# Patient Record
Sex: FEMALE | Race: ASIAN | NOT HISPANIC OR LATINO
[De-identification: names, ages, dates, MRNs, and addresses within clinical notes are randomized per-mention and may not be internally consistent; named-entity substitution may affect disease eponyms.]

---

## 2017-08-29 ENCOUNTER — RX RENEWAL (OUTPATIENT)
Age: 67
End: 2017-08-29

## 2017-11-15 ENCOUNTER — RX RENEWAL (OUTPATIENT)
Age: 67
End: 2017-11-15

## 2018-03-29 ENCOUNTER — RX RENEWAL (OUTPATIENT)
Age: 68
End: 2018-03-29

## 2018-04-24 ENCOUNTER — RX RENEWAL (OUTPATIENT)
Age: 68
End: 2018-04-24

## 2018-05-24 ENCOUNTER — NON-APPOINTMENT (OUTPATIENT)
Age: 68
End: 2018-05-24

## 2018-05-24 ENCOUNTER — APPOINTMENT (OUTPATIENT)
Dept: PULMONOLOGY | Facility: CLINIC | Age: 68
End: 2018-05-24
Payer: COMMERCIAL

## 2018-05-24 VITALS
DIASTOLIC BLOOD PRESSURE: 72 MMHG | OXYGEN SATURATION: 97 % | HEIGHT: 62 IN | TEMPERATURE: 97.8 F | BODY MASS INDEX: 25.4 KG/M2 | HEART RATE: 67 BPM | RESPIRATION RATE: 12 BRPM | WEIGHT: 138 LBS | SYSTOLIC BLOOD PRESSURE: 122 MMHG

## 2018-05-24 DIAGNOSIS — Z00.00 ENCOUNTER FOR GENERAL ADULT MEDICAL EXAMINATION W/OUT ABNORMAL FINDINGS: ICD-10-CM

## 2018-05-24 PROCEDURE — 99214 OFFICE O/P EST MOD 30 MIN: CPT

## 2018-05-24 PROCEDURE — 93000 ELECTROCARDIOGRAM COMPLETE: CPT

## 2018-05-25 LAB
ALBUMIN SERPL ELPH-MCNC: 3.6 G/DL
ALP BLD-CCNC: 108 U/L
ALT SERPL-CCNC: 19 U/L
ANION GAP SERPL CALC-SCNC: 17 MMOL/L
AST SERPL-CCNC: 18 U/L
BASOPHILS # BLD AUTO: 0.04 K/UL
BASOPHILS NFR BLD AUTO: 0.5 %
BILIRUB SERPL-MCNC: 0.3 MG/DL
BUN SERPL-MCNC: 21 MG/DL
CALCIUM SERPL-MCNC: 9.9 MG/DL
CHLORIDE SERPL-SCNC: 102 MMOL/L
CHOLEST SERPL-MCNC: 238 MG/DL
CHOLEST/HDLC SERPL: 3 RATIO
CO2 SERPL-SCNC: 22 MMOL/L
CREAT SERPL-MCNC: 0.81 MG/DL
EOSINOPHIL # BLD AUTO: 0.37 K/UL
EOSINOPHIL NFR BLD AUTO: 4.4 %
GLUCOSE SERPL-MCNC: 119 MG/DL
HBA1C MFR BLD HPLC: 7.8 %
HCT VFR BLD CALC: 38.3 %
HDLC SERPL-MCNC: 80 MG/DL
HGB BLD-MCNC: 12.3 G/DL
IMM GRANULOCYTES NFR BLD AUTO: 0.2 %
INR PPP: 0.95 RATIO
LDLC SERPL CALC-MCNC: 127 MG/DL
LYMPHOCYTES # BLD AUTO: 2.42 K/UL
LYMPHOCYTES NFR BLD AUTO: 28.9 %
MAN DIFF?: NORMAL
MCHC RBC-ENTMCNC: 26.4 PG
MCHC RBC-ENTMCNC: 32.1 GM/DL
MCV RBC AUTO: 82.2 FL
MONOCYTES # BLD AUTO: 0.38 K/UL
MONOCYTES NFR BLD AUTO: 4.5 %
NEUTROPHILS # BLD AUTO: 5.14 K/UL
NEUTROPHILS NFR BLD AUTO: 61.5 %
PLATELET # BLD AUTO: 253 K/UL
POTASSIUM SERPL-SCNC: 3.9 MMOL/L
PROT SERPL-MCNC: 7.3 G/DL
PT BLD: 10.7 SEC
RBC # BLD: 4.66 M/UL
RBC # FLD: 15 %
SODIUM SERPL-SCNC: 141 MMOL/L
T4 SERPL-MCNC: 8.3 UG/DL
TRIGL SERPL-MCNC: 154 MG/DL
TSH SERPL-ACNC: 0.69 UIU/ML
WBC # FLD AUTO: 8.37 K/UL

## 2021-02-05 ENCOUNTER — EMERGENCY (EMERGENCY)
Facility: HOSPITAL | Age: 71
LOS: 0 days | Discharge: HOME | End: 2021-02-05
Attending: STUDENT IN AN ORGANIZED HEALTH CARE EDUCATION/TRAINING PROGRAM
Payer: MEDICARE

## 2021-02-05 VITALS
RESPIRATION RATE: 16 BRPM | HEIGHT: 63 IN | SYSTOLIC BLOOD PRESSURE: 175 MMHG | OXYGEN SATURATION: 98 % | DIASTOLIC BLOOD PRESSURE: 80 MMHG | WEIGHT: 128.09 LBS | TEMPERATURE: 98 F | HEART RATE: 80 BPM

## 2021-02-05 VITALS
OXYGEN SATURATION: 98 % | TEMPERATURE: 97 F | DIASTOLIC BLOOD PRESSURE: 70 MMHG | RESPIRATION RATE: 16 BRPM | SYSTOLIC BLOOD PRESSURE: 123 MMHG | HEART RATE: 73 BPM

## 2021-02-05 DIAGNOSIS — Z20.822 CONTACT WITH AND (SUSPECTED) EXPOSURE TO COVID-19: ICD-10-CM

## 2021-02-05 DIAGNOSIS — M25.571 PAIN IN RIGHT ANKLE AND JOINTS OF RIGHT FOOT: ICD-10-CM
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
## 2021-02-05 DIAGNOSIS — S82.851A DISPLACED TRIMALLEOLAR FRACTURE OF RIGHT LOWER LEG, INITIAL ENCOUNTER FOR CLOSED FRACTURE: ICD-10-CM
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
## 2021-02-05 DIAGNOSIS — I10 ESSENTIAL (PRIMARY) HYPERTENSION: ICD-10-CM

## 2021-02-05 LAB
FLUAV AG NPH QL: SIGNIFICANT CHANGE UP
FLUBV AG NPH QL: SIGNIFICANT CHANGE UP
GLUCOSE BLDC GLUCOMTR-MCNC: 215 MG/DL — HIGH (ref 70–99)
SARS-COV-2 RNA SPEC QL NAA+PROBE: SIGNIFICANT CHANGE UP

## 2021-02-05 PROCEDURE — 99285 EMERGENCY DEPT VISIT HI MDM: CPT

## 2021-02-05 PROCEDURE — 73700 CT LOWER EXTREMITY W/O DYE: CPT | Mod: 26,RT,MA

## 2021-02-05 PROCEDURE — 73590 X-RAY EXAM OF LOWER LEG: CPT | Mod: 26,RT

## 2021-02-05 PROCEDURE — 73600 X-RAY EXAM OF ANKLE: CPT | Mod: 26,RT,76

## 2021-02-05 RX ORDER — IBUPROFEN 200 MG
600 TABLET ORAL ONCE
Refills: 0 | Status: COMPLETED | OUTPATIENT
Start: 2021-02-05 | End: 2021-02-05

## 2021-02-05 RX ORDER — OXYCODONE AND ACETAMINOPHEN 5; 325 MG/1; MG/1
1 TABLET ORAL ONCE
Refills: 0 | Status: DISCONTINUED | OUTPATIENT
Start: 2021-02-05 | End: 2021-02-05

## 2021-02-05 RX ORDER — ACETAMINOPHEN 500 MG
650 TABLET ORAL ONCE
Refills: 0 | Status: COMPLETED | OUTPATIENT
Start: 2021-02-05 | End: 2021-02-05

## 2021-02-05 RX ORDER — METFORMIN HYDROCHLORIDE 850 MG/1
1000 TABLET ORAL ONCE
Refills: 0 | Status: COMPLETED | OUTPATIENT
Start: 2021-02-05 | End: 2021-02-05

## 2021-02-05 RX ORDER — METOPROLOL TARTRATE 50 MG
25 TABLET ORAL ONCE
Refills: 0 | Status: COMPLETED | OUTPATIENT
Start: 2021-02-05 | End: 2021-02-05

## 2021-02-05 RX ADMIN — METFORMIN HYDROCHLORIDE 1000 MILLIGRAM(S): 850 TABLET ORAL at 12:47

## 2021-02-05 RX ADMIN — Medication 650 MILLIGRAM(S): at 01:30

## 2021-02-05 RX ADMIN — Medication 600 MILLIGRAM(S): at 02:22

## 2021-02-05 RX ADMIN — OXYCODONE AND ACETAMINOPHEN 1 TABLET(S): 5; 325 TABLET ORAL at 06:10

## 2021-02-05 RX ADMIN — OXYCODONE AND ACETAMINOPHEN 1 TABLET(S): 5; 325 TABLET ORAL at 16:23

## 2021-02-05 RX ADMIN — Medication 25 MILLIGRAM(S): at 12:47

## 2021-02-05 NOTE — ED PROVIDER NOTE - CLINICAL SUMMARY MEDICAL DECISION MAKING FREE TEXT BOX
likely r. ankle fx, XR, no other traumatic injuries. neurovascularly intact.   - pain control prn  - ortho consult.

## 2021-02-05 NOTE — ED PROVIDER NOTE - PROGRESS NOTE DETAILS
discussed with ortho, to be splinted. ortho pending post-reduction XR, then CT and f/u with ortho on Monday. Crutches for home. Patient would also like SW input on possible home health aide to care for  who has dementia. Needs transport home as well. pending CT and dispo. carleen-  sign out from dr catalan. pt ok for dc. sw helping arrange transport and home assistance

## 2021-02-05 NOTE — ED PROVIDER NOTE - PATIENT PORTAL LINK FT
You can access the FollowMyHealth Patient Portal offered by [facility] by registering at the following website: [email or web address]. By joining [facility]’s FollowMyHealth portal, you will also be able to view your health information using other applications (apps) compatible with our system.

## 2021-02-05 NOTE — CONSULT NOTE ADULT - SUBJECTIVE AND OBJECTIVE BOX
70yFemale c/o R ankle pain s/p mechanical fall. Denies headstrike/LOC. Denies any other trauma or injuries. Denies numbness/tingling. Community ambulator at baseline.    PAST MEDICAL & SURGICAL HISTORY:    Home Medications:  aspirin 325 mg oral delayed release tablet: 1 tab(s) orally once a day (05 Feb 2021 02:55)  metFORMIN 1000 mg oral tablet: 1 tab(s) orally 2 times a day (05 Feb 2021 02:55)  metoprolol tartrate 25 mg oral tablet: 1 tab(s) orally 2 times a day (05 Feb 2021 02:55)  Synthroid 100 mcg (0.1 mg) oral tablet: 1 tab(s) orally once a day (05 Feb 2021 02:55)  Zoloft 100 mg oral tablet: 1 tab(s) orally once a day (05 Feb 2021 02:55)    Allergies    No Known Allergies    Intolerances        Vital Signs Last 24 Hrs  T(C): 36.4 (05 Feb 2021 00:41), Max: 36.4 (05 Feb 2021 00:41)  T(F): 97.5 (05 Feb 2021 00:41), Max: 97.5 (05 Feb 2021 00:41)  HR: 80 (05 Feb 2021 00:41) (80 - 80)  BP: 175/80 (05 Feb 2021 00:41) (175/80 - 175/80)  BP(mean): --  RR: 16 (05 Feb 2021 00:41) (16 - 16)  SpO2: 98% (05 Feb 2021 00:41) (98% - 98%)  Physical Exam  Gen: NAD, resting comfortably  RLE  Skin intact  TTP over ankle  ROM limited due to pain  +TA/EHL/FHL/GS  SILT L2-S1  DP/PT 2+  Compartments soft and compressible    RUE: No bony tenderness or deformity, skin intact  LUE: No bony tenderness or deformity, skin intact  LLE: No bony tenderness or deformity, skin intact  Spine: No tenderness or stepoffs, skin intact                    Imaging:  XR R Ankle: Showing trimalleolar ankle fx      Procedure: Patient advised of need for closed reduction of fracture, patient verbalized understanding and consented to the procedure. Patient neurovascularly intact pre-procedure. Closed reduction performed followed by placement of a well padded trilam splint. Patient tolerated the procedure well. Post procedure imaging obtained and showed improved alignment. Post procedure the patient was NV intact.    A/P: 70yFemale with R ankle fracture s/p closed reduction and splinting    - Pain control  - NWB rLE in splint  - Keep splint clean, dry and intact until follow up  - Rest ice elevate  - Ice/elevation  - Follow up with Dr. Espinosa in office, please call office for appointment  - Will discuss plan with attending and advise of any changes

## 2021-02-05 NOTE — ED ADULT NURSE NOTE - NSIMPLEMENTINTERV_GEN_ALL_ED
Implemented All Fall Risk Interventions:  [place] to call system. Call bell, personal items and telephone within reach. Instruct patient to call for assistance. Room bathroom lighting operational. Non-slip footwear when patient is off stretcher. Physically safe environment: no spills, clutter or unnecessary equipment. Stretcher in lowest position, wheels locked, appropriate side rails in place. Provide visual cue, wrist band, yellow gown, etc. Monitor gait and stability. Monitor for mental status changes and reorient to person, place, and time. Review medications for side effects contributing to fall risk. Reinforce activity limits and safety measures with patient and family.

## 2021-02-05 NOTE — ED ADULT NURSE NOTE - CAS EDN DISCHARGE ASSESSMENT
Alert and oriented to person, place and time/Symptoms improved/No adverse reaction to first time med in ED/Neuro vascular intact post splinting

## 2021-02-05 NOTE — ED ADULT NURSE REASSESSMENT NOTE - NS ED NURSE REASSESS COMMENT FT1
patient received at 0705 a/o x4 in bed 29 , right leg cast in place, toes warm and mobile, verbalizes 6/10 right leg pain but admits its much better. Pending ortho re eval. after post reduction x-rays and ct. scan.

## 2021-02-05 NOTE — ED PROVIDER NOTE - NSFOLLOWUPINSTRUCTIONS_ED_ALL_ED_FT
Fracture    A fracture is a break in one of your bones. This can occur from a variety of injuries, especially traumatic ones. Symptoms include pain, bruising, or swelling. Do not use the injured limb. If a fracture is in one of the bones below your waist, do not put weight on that limb unless instructed to do so by your healthcare provider. Crutches or a cane may have been provided. A splint or cast may have been applied by your health care provider. Make sure to keep it dry and follow up with an orthopedist as instructed.    SEEK IMMEDIATE MEDICAL CARE IF YOU HAVE ANY OF THE FOLLOWING SYMPTOMS: numbness, tingling, increasing pain, or weakness in any part of the injured limb.     Take acetaminophen 650 mg orally every 6-8 hours for pain control as needed. Please do not exceed 4,000 mg of acetaminophen during a 24 hours period. Acetaminophen can be found in many over-the-counter cold medications as well as opioid medications that may be given for pain.    Take ibuprofen (also known as MOTRIN or ADVIL) 400 mg orally every 6-8 hours for pain control as needed with food to avoid an upset stomach. Ibuprofen can be found in many over-the-counter medications. Please do not take ibuprofen if you have a bleeding disorder, stomach or gastrointestinal ulcer, or liver disease.    If needed, you can alternate these medications so that you can take one medication every 3 hours. For example, at noon take ibuprofen, then at 3PM take acetaminophen, then at 6PM take ibuprofen.     Please take one tablet of PERCOCET every 8 hours as needed for SEVERE pain. Please do not take this medication unless you absolutely need it, as it is very addictive. Please do not drive, operate heavy machinery, or make important decisions while on this medication as it can cloud your judgement.     Rest, drink plenty of fluids.  Advance activity as tolerated.  Continue all previously prescribed medications as directed.  Follow up with your PMD 2-3 days and bring copies of your results.  Follow up with Dr. Greene on Monday.

## 2021-02-05 NOTE — ED PROVIDER NOTE - PHYSICAL EXAMINATION
VITAL SIGNS: I have reviewed nursing notes and confirm.   GEN: Well-developed; well-nourished; in no acute distress. Speaking full sentences. GCS 15  SKIN: Warm, pink, no rash, no diaphoresis, no cyanosis, well perfused.   HEAD: Normocephalic; atraumatic. No scalp lacerations, no abrasions.  NECK: Supple; non tender. no midline ttp, no step offs. full active/passive rom w/o difficulty.  EYES: Pupils 3mm equal, round, reactive to light and accomodation, conjunctiva and sclera clear. Extra-ocular movements intact bilaterally.  ENT: No nasal discharge; airway clear. Trachea is midline. ORAL: No oropharyngeal exudates or erythema. Normal dentition.  CV: Regular rate and rhythm. S1, S2 normal; no murmurs, gallops, or rubs. No lower extremity pitting edema bilaterally. Capillary refill < 2 seconds throughout. Distal pulses intact 2+ throughout. No chest wall ttp.  RESP: CTA bilaterally. No wheezes, rales, or rhonchi.   ABD: Normal bowel sounds, soft, non-distended, non-tender, no hepatosplenomegaly. No CVA tenderness bilaterally.   MSK: Normal range of motion and movement of all 4 extremities. No joint or muscular pain throughout. No clubbing. EXCEPT: RIGHT LE: (+) deformed right ankle, ttp moderate-severe, neurovascularly intact. No tib-fib ttp throughout, full active/passive ROM with right knee.   BACK: No thoracolumbar midline or paravertebral tenderness. No step-offs or obvious deformities.  NEURO: Alert & oriented x 3, Grossly unremarkable. Sensory and motor intact throughout. No focal deficits. Normal speech and coordination.   PSYCH: Cooperative, appropriate.

## 2021-02-05 NOTE — ED PROVIDER NOTE - OBJECTIVE STATEMENT
70F PMHx of HTN presenting with right ankle pain s/p mechanical fall today. Inverted her ankle while walking down steps, caught her fall. Denies any head trauma, neck injury, neck pain, back pain, chest pain, other extremity injury, lacerations, abrasions, headaches, nausea/vomiting, anticoagulation, antiplatelet use. Was unable to ambulate after injury. Described as constant, achy, moderate-severe right ankle pain radiating up right leg.

## 2021-02-05 NOTE — ED ADULT NURSE NOTE - OBJECTIVE STATEMENT
pt s/t trauma to the right foot. when stepping out from the house to the car. pt states she did not fall. swollen right ankle noted.

## 2021-02-10 ENCOUNTER — APPOINTMENT (OUTPATIENT)
Dept: ORTHOPEDIC SURGERY | Facility: CLINIC | Age: 71
End: 2021-02-10
Payer: MEDICARE

## 2021-02-10 VITALS
HEART RATE: 68 BPM | BODY MASS INDEX: 22.68 KG/M2 | DIASTOLIC BLOOD PRESSURE: 62 MMHG | SYSTOLIC BLOOD PRESSURE: 137 MMHG | HEIGHT: 63 IN | WEIGHT: 128 LBS

## 2021-02-10 VITALS — TEMPERATURE: 97.8 F

## 2021-02-10 PROCEDURE — 99072 ADDL SUPL MATRL&STAF TM PHE: CPT

## 2021-02-10 PROCEDURE — 99204 OFFICE O/P NEW MOD 45 MIN: CPT

## 2021-02-11 ENCOUNTER — OUTPATIENT (OUTPATIENT)
Dept: OUTPATIENT SERVICES | Facility: HOSPITAL | Age: 71
LOS: 1 days | End: 2021-02-11
Payer: MEDICARE

## 2021-02-11 VITALS
TEMPERATURE: 97 F | HEART RATE: 66 BPM | SYSTOLIC BLOOD PRESSURE: 137 MMHG | DIASTOLIC BLOOD PRESSURE: 72 MMHG | OXYGEN SATURATION: 97 % | RESPIRATION RATE: 18 BRPM | WEIGHT: 128.09 LBS | HEIGHT: 63 IN

## 2021-02-11 DIAGNOSIS — Z90.49 ACQUIRED ABSENCE OF OTHER SPECIFIED PARTS OF DIGESTIVE TRACT: Chronic | ICD-10-CM

## 2021-02-11 DIAGNOSIS — Z98.51 TUBAL LIGATION STATUS: Chronic | ICD-10-CM

## 2021-02-11 DIAGNOSIS — S82.853B DISPLACED TRIMALLEOLAR FRACTURE OF UNSPECIFIED LOWER LEG, INITIAL ENCOUNTER FOR OPEN FRACTURE TYPE I OR II: ICD-10-CM

## 2021-02-11 DIAGNOSIS — Z90.89 ACQUIRED ABSENCE OF OTHER ORGANS: Chronic | ICD-10-CM

## 2021-02-11 DIAGNOSIS — S82.899A OTHER FRACTURE OF UNSPECIFIED LOWER LEG, INITIAL ENCOUNTER FOR CLOSED FRACTURE: ICD-10-CM

## 2021-02-11 DIAGNOSIS — E11.9 TYPE 2 DIABETES MELLITUS WITHOUT COMPLICATIONS: ICD-10-CM

## 2021-02-11 LAB
ANION GAP SERPL CALC-SCNC: 12 MMOL/L — SIGNIFICANT CHANGE UP (ref 5–17)
BUN SERPL-MCNC: 22 MG/DL — SIGNIFICANT CHANGE UP (ref 7–23)
CALCIUM SERPL-MCNC: 9.8 MG/DL — SIGNIFICANT CHANGE UP (ref 8.4–10.5)
CHLORIDE SERPL-SCNC: 102 MMOL/L — SIGNIFICANT CHANGE UP (ref 96–108)
CO2 SERPL-SCNC: 24 MMOL/L — SIGNIFICANT CHANGE UP (ref 22–31)
CREAT SERPL-MCNC: 0.91 MG/DL — SIGNIFICANT CHANGE UP (ref 0.5–1.3)
GLUCOSE SERPL-MCNC: 165 MG/DL — HIGH (ref 70–99)
HCT VFR BLD CALC: 35.2 % — SIGNIFICANT CHANGE UP (ref 34.5–45)
HGB BLD-MCNC: 11 G/DL — LOW (ref 11.5–15.5)
MCHC RBC-ENTMCNC: 26.4 PG — LOW (ref 27–34)
MCHC RBC-ENTMCNC: 31.3 GM/DL — LOW (ref 32–36)
MCV RBC AUTO: 84.4 FL — SIGNIFICANT CHANGE UP (ref 80–100)
NRBC # BLD: 0 /100 WBCS — SIGNIFICANT CHANGE UP (ref 0–0)
PLATELET # BLD AUTO: 263 K/UL — SIGNIFICANT CHANGE UP (ref 150–400)
POTASSIUM SERPL-MCNC: 4.4 MMOL/L — SIGNIFICANT CHANGE UP (ref 3.5–5.3)
POTASSIUM SERPL-SCNC: 4.4 MMOL/L — SIGNIFICANT CHANGE UP (ref 3.5–5.3)
RBC # BLD: 4.17 M/UL — SIGNIFICANT CHANGE UP (ref 3.8–5.2)
RBC # FLD: 13.5 % — SIGNIFICANT CHANGE UP (ref 10.3–14.5)
SODIUM SERPL-SCNC: 138 MMOL/L — SIGNIFICANT CHANGE UP (ref 135–145)
WBC # BLD: 8.95 K/UL — SIGNIFICANT CHANGE UP (ref 3.8–10.5)
WBC # FLD AUTO: 8.95 K/UL — SIGNIFICANT CHANGE UP (ref 3.8–10.5)

## 2021-02-11 PROCEDURE — 83036 HEMOGLOBIN GLYCOSYLATED A1C: CPT

## 2021-02-11 PROCEDURE — 80048 BASIC METABOLIC PNL TOTAL CA: CPT

## 2021-02-11 PROCEDURE — G0463: CPT

## 2021-02-11 PROCEDURE — 85027 COMPLETE CBC AUTOMATED: CPT

## 2021-02-11 RX ORDER — CEFAZOLIN SODIUM 1 G
2000 VIAL (EA) INJECTION ONCE
Refills: 0 | Status: DISCONTINUED | OUTPATIENT
Start: 2021-02-15 | End: 2021-03-01

## 2021-02-11 RX ORDER — LIDOCAINE HCL 20 MG/ML
0.2 VIAL (ML) INJECTION ONCE
Refills: 0 | Status: DISCONTINUED | OUTPATIENT
Start: 2021-02-15 | End: 2021-03-01

## 2021-02-11 RX ORDER — SODIUM CHLORIDE 9 MG/ML
3 INJECTION INTRAMUSCULAR; INTRAVENOUS; SUBCUTANEOUS EVERY 8 HOURS
Refills: 0 | Status: DISCONTINUED | OUTPATIENT
Start: 2021-02-15 | End: 2021-03-01

## 2021-02-11 RX ORDER — CHLORHEXIDINE GLUCONATE 213 G/1000ML
1 SOLUTION TOPICAL ONCE
Refills: 0 | Status: DISCONTINUED | OUTPATIENT
Start: 2021-02-15 | End: 2021-03-01

## 2021-02-11 NOTE — H&P PST ADULT - NSICDXPROBLEM_GEN_ALL_CORE_FT
PROBLEM DIAGNOSES  Problem: Ankle fracture  Assessment and Plan: ORIF right ankle, patient is stopping aspirin today, takes preventively    Problem: Diabetes mellitus  Assessment and Plan: Instructed to hold metformin 24 hours prior to surgery

## 2021-02-11 NOTE — H&P PST ADULT - NSICDXPASTMEDICALHX_GEN_ALL_CORE_FT
PAST MEDICAL HISTORY:  DM (diabetes mellitus)     HTN (hypertension)     Hypothyroid     Palpitations

## 2021-02-11 NOTE — H&P PST ADULT - HISTORY OF PRESENT ILLNESS
This is a 89 y/o female PMH family H/O heart disease, had angiogram "many years ago as a prophylaxis", which demonstrated normal coronaries, c/o H/O palpitations, on beta blocker, S/P fall one week ago sustaining right ankle fracture.  Presents today for ORIF right ankle.

## 2021-02-12 ENCOUNTER — OUTPATIENT (OUTPATIENT)
Dept: OUTPATIENT SERVICES | Facility: HOSPITAL | Age: 71
LOS: 1 days | End: 2021-02-12
Payer: MEDICARE

## 2021-02-12 DIAGNOSIS — Z90.89 ACQUIRED ABSENCE OF OTHER ORGANS: Chronic | ICD-10-CM

## 2021-02-12 DIAGNOSIS — Z11.52 ENCOUNTER FOR SCREENING FOR COVID-19: ICD-10-CM

## 2021-02-12 DIAGNOSIS — Z98.51 TUBAL LIGATION STATUS: Chronic | ICD-10-CM

## 2021-02-12 DIAGNOSIS — Z90.49 ACQUIRED ABSENCE OF OTHER SPECIFIED PARTS OF DIGESTIVE TRACT: Chronic | ICD-10-CM

## 2021-02-12 LAB
A1C WITH ESTIMATED AVERAGE GLUCOSE RESULT: 8.4 % — HIGH (ref 4–5.6)
ESTIMATED AVERAGE GLUCOSE: 194 MG/DL — HIGH (ref 68–114)
SARS-COV-2 RNA SPEC QL NAA+PROBE: SIGNIFICANT CHANGE UP

## 2021-02-14 ENCOUNTER — TRANSCRIPTION ENCOUNTER (OUTPATIENT)
Age: 71
End: 2021-02-14

## 2021-02-15 ENCOUNTER — APPOINTMENT (OUTPATIENT)
Dept: ORTHOPEDIC SURGERY | Facility: HOSPITAL | Age: 71
End: 2021-02-15

## 2021-02-15 ENCOUNTER — OUTPATIENT (OUTPATIENT)
Dept: OUTPATIENT SERVICES | Facility: HOSPITAL | Age: 71
LOS: 1 days | End: 2021-02-15
Payer: MEDICARE

## 2021-02-15 VITALS
DIASTOLIC BLOOD PRESSURE: 54 MMHG | SYSTOLIC BLOOD PRESSURE: 103 MMHG | TEMPERATURE: 98 F | RESPIRATION RATE: 15 BRPM | OXYGEN SATURATION: 97 % | HEART RATE: 86 BPM

## 2021-02-15 VITALS
RESPIRATION RATE: 18 BRPM | HEART RATE: 65 BPM | OXYGEN SATURATION: 99 % | DIASTOLIC BLOOD PRESSURE: 64 MMHG | TEMPERATURE: 98 F | SYSTOLIC BLOOD PRESSURE: 129 MMHG

## 2021-02-15 DIAGNOSIS — S82.853B DISPLACED TRIMALLEOLAR FRACTURE OF UNSPECIFIED LOWER LEG, INITIAL ENCOUNTER FOR OPEN FRACTURE TYPE I OR II: ICD-10-CM

## 2021-02-15 DIAGNOSIS — Z90.49 ACQUIRED ABSENCE OF OTHER SPECIFIED PARTS OF DIGESTIVE TRACT: Chronic | ICD-10-CM

## 2021-02-15 DIAGNOSIS — Z90.89 ACQUIRED ABSENCE OF OTHER ORGANS: Chronic | ICD-10-CM

## 2021-02-15 DIAGNOSIS — Z98.51 TUBAL LIGATION STATUS: Chronic | ICD-10-CM

## 2021-02-15 LAB
GLUCOSE BLDC GLUCOMTR-MCNC: 177 MG/DL — HIGH (ref 70–99)
GLUCOSE BLDC GLUCOMTR-MCNC: 197 MG/DL — HIGH (ref 70–99)

## 2021-02-15 PROCEDURE — U0005: CPT

## 2021-02-15 PROCEDURE — 27814 TREATMENT OF ANKLE FRACTURE: CPT | Mod: RT

## 2021-02-15 PROCEDURE — 27822 TREATMENT OF ANKLE FRACTURE: CPT | Mod: RT

## 2021-02-15 PROCEDURE — C1889: CPT

## 2021-02-15 PROCEDURE — C9803: CPT

## 2021-02-15 PROCEDURE — 27829 TREAT LOWER LEG JOINT: CPT | Mod: RT

## 2021-02-15 PROCEDURE — C1713: CPT

## 2021-02-15 PROCEDURE — U0003: CPT

## 2021-02-15 PROCEDURE — 82962 GLUCOSE BLOOD TEST: CPT

## 2021-02-15 PROCEDURE — 76000 FLUOROSCOPY <1 HR PHYS/QHP: CPT

## 2021-02-15 RX ORDER — ONDANSETRON 8 MG/1
4 TABLET, FILM COATED ORAL ONCE
Refills: 0 | Status: DISCONTINUED | OUTPATIENT
Start: 2021-02-15 | End: 2021-02-15

## 2021-02-15 RX ORDER — HYDROMORPHONE HYDROCHLORIDE 2 MG/ML
0.5 INJECTION INTRAMUSCULAR; INTRAVENOUS; SUBCUTANEOUS
Refills: 0 | Status: DISCONTINUED | OUTPATIENT
Start: 2021-02-15 | End: 2021-02-15

## 2021-02-15 RX ORDER — LEVOTHYROXINE SODIUM 125 MCG
100 TABLET ORAL DAILY
Refills: 0 | Status: DISCONTINUED | OUTPATIENT
Start: 2021-02-15 | End: 2021-03-01

## 2021-02-15 RX ORDER — SERTRALINE 25 MG/1
100 TABLET, FILM COATED ORAL DAILY
Refills: 0 | Status: DISCONTINUED | OUTPATIENT
Start: 2021-02-15 | End: 2021-03-01

## 2021-02-15 RX ORDER — ASPIRIN/CALCIUM CARB/MAGNESIUM 324 MG
1 TABLET ORAL
Qty: 0 | Refills: 0 | DISCHARGE

## 2021-02-15 RX ORDER — SODIUM CHLORIDE 9 MG/ML
1000 INJECTION, SOLUTION INTRAVENOUS
Refills: 0 | Status: DISCONTINUED | OUTPATIENT
Start: 2021-02-15 | End: 2021-03-01

## 2021-02-15 RX ORDER — OXYCODONE AND ACETAMINOPHEN 5; 325 MG/1; MG/1
1 TABLET ORAL
Qty: 32 | Refills: 0
Start: 2021-02-15 | End: 2021-02-22

## 2021-02-15 RX ORDER — METOPROLOL TARTRATE 50 MG
25 TABLET ORAL
Refills: 0 | Status: DISCONTINUED | OUTPATIENT
Start: 2021-02-15 | End: 2021-03-01

## 2021-02-15 RX ORDER — ASPIRIN/CALCIUM CARB/MAGNESIUM 324 MG
325 TABLET ORAL DAILY
Refills: 0 | Status: DISCONTINUED | OUTPATIENT
Start: 2021-02-15 | End: 2021-03-01

## 2021-02-15 RX ORDER — ASPIRIN/CALCIUM CARB/MAGNESIUM 324 MG
1 TABLET ORAL
Qty: 60 | Refills: 0
Start: 2021-02-15 | End: 2021-03-16

## 2021-02-15 RX ORDER — ACETAMINOPHEN 500 MG
1000 TABLET ORAL ONCE
Refills: 0 | Status: COMPLETED | OUTPATIENT
Start: 2021-02-15 | End: 2021-02-15

## 2021-02-15 RX ADMIN — Medication 400 MILLIGRAM(S): at 18:00

## 2021-02-15 NOTE — PROGRESS NOTE ADULT - SUBJECTIVE AND OBJECTIVE BOX
ORTHO ATTENDING POST OP    s/p ORIF R ankle  NWB R  LE  AO splint  elevation   BID  percocet to pharmacy  ice  f/u 2 weeks

## 2021-02-15 NOTE — BRIEF OPERATIVE NOTE - NSICDXBRIEFPREOP_GEN_ALL_CORE_FT
PRE-OP DIAGNOSIS:  Closed right trimalleolar fracture 15-Feb-2021 16:35:43  Corby Mendosa  Closed fracture of ankle, initial encounter 15-Feb-2021 16:35:16  Corby Mendosa

## 2021-02-15 NOTE — ASU DISCHARGE PLAN (ADULT/PEDIATRIC) - PROCEDURE
ORIF Right Ankle ORIF (open reduction internal fixation) of Right Ankle with adductor canal nerve block and popliteal nerve block

## 2021-02-15 NOTE — ASU DISCHARGE PLAN (ADULT/PEDIATRIC) - ASU DC SPECIAL INSTRUCTIONSFT
Please follow up with Dr. Roach in the office in 2 weeks and call to make an appointment beforehand    Please keep splint clean and dry at all times    Please do not bear weight with your right leg    Please take medications as perscribed Please follow up with Dr. Roach in the office in 2 weeks and call to make an appointment beforehand    Please keep splint clean and dry at all times    Please do not bear weight with your right leg    Please take medications as prescribed    s/p ORIF R ankle  NWB R  LE  AO splint  elevation   BID  percocet to pharmacy  ice  f/u 2 weeks

## 2021-02-15 NOTE — ASU DISCHARGE PLAN (ADULT/PEDIATRIC) - CARE PROVIDER_API CALL
Addy Roach (MD)  Orthopaedic Surgery  [address] [place] [address]  Phone: [phone]  Fax: [phone]  Follow Up Time: 2 weeks

## 2021-02-16 PROBLEM — R00.2 PALPITATIONS: Chronic | Status: ACTIVE | Noted: 2021-02-11

## 2021-02-16 PROBLEM — E11.9 TYPE 2 DIABETES MELLITUS WITHOUT COMPLICATIONS: Chronic | Status: ACTIVE | Noted: 2021-02-11

## 2021-02-16 PROBLEM — E03.9 HYPOTHYROIDISM, UNSPECIFIED: Chronic | Status: ACTIVE | Noted: 2021-02-11

## 2021-03-03 ENCOUNTER — APPOINTMENT (OUTPATIENT)
Dept: ORTHOPEDIC SURGERY | Facility: CLINIC | Age: 71
End: 2021-03-03
Payer: MEDICARE

## 2021-03-03 PROCEDURE — 99024 POSTOP FOLLOW-UP VISIT: CPT

## 2021-03-23 ENCOUNTER — APPOINTMENT (OUTPATIENT)
Dept: ORTHOPEDIC SURGERY | Facility: CLINIC | Age: 71
End: 2021-03-23
Payer: MEDICARE

## 2021-03-23 PROCEDURE — 73610 X-RAY EXAM OF ANKLE: CPT | Mod: RT

## 2021-03-23 PROCEDURE — 99024 POSTOP FOLLOW-UP VISIT: CPT

## 2021-04-20 ENCOUNTER — APPOINTMENT (OUTPATIENT)
Dept: ORTHOPEDIC SURGERY | Facility: CLINIC | Age: 71
End: 2021-04-20
Payer: MEDICARE

## 2021-04-20 PROCEDURE — 99024 POSTOP FOLLOW-UP VISIT: CPT

## 2021-04-20 PROCEDURE — 73610 X-RAY EXAM OF ANKLE: CPT | Mod: RT

## 2021-05-11 NOTE — ED PROVIDER NOTE - NEURO NEGATIVE STATEMENT, MLM
no loss of consciousness, no gait abnormality, no headache, no sensory deficits, and no weakness. Detail Level: Generalized General Sunscreen Counseling: I recommended a broad spectrum sunscreen with a SPF of 30 or higher.  I explained that SPF 30 sunscreens block approximately 97 percent of the sun's harmful rays.  Sunscreens should be applied at least 15 minutes prior to expected sun exposure and then every 2 hours after that as long as sun exposure continues. If swimming or exercising sunscreen should be reapplied every 45 minutes to an hour after getting wet or sweating.  One ounce, or the equivalent of a shot glass full of sunscreen, is adequate to protect the skin not covered by a bathing suit. I also recommended a lip balm with a sunscreen as well. Sun protective clothing can be used in lieu of sunscreen but must be worn the entire time you are exposed to the sun's rays.

## 2021-05-25 ENCOUNTER — APPOINTMENT (OUTPATIENT)
Dept: ORTHOPEDIC SURGERY | Facility: CLINIC | Age: 71
End: 2021-05-25
Payer: MEDICARE

## 2021-05-25 VITALS
SYSTOLIC BLOOD PRESSURE: 126 MMHG | WEIGHT: 128 LBS | HEART RATE: 69 BPM | BODY MASS INDEX: 22.68 KG/M2 | HEIGHT: 63 IN | DIASTOLIC BLOOD PRESSURE: 70 MMHG

## 2021-05-25 PROCEDURE — 99072 ADDL SUPL MATRL&STAF TM PHE: CPT

## 2021-05-25 PROCEDURE — 73610 X-RAY EXAM OF ANKLE: CPT | Mod: RT

## 2021-05-25 PROCEDURE — 99213 OFFICE O/P EST LOW 20 MIN: CPT

## 2021-08-30 ENCOUNTER — APPOINTMENT (OUTPATIENT)
Dept: ORTHOPEDIC SURGERY | Facility: CLINIC | Age: 71
End: 2021-08-30
Payer: MEDICARE

## 2021-08-30 VITALS
DIASTOLIC BLOOD PRESSURE: 69 MMHG | HEART RATE: 66 BPM | BODY MASS INDEX: 21.44 KG/M2 | WEIGHT: 121 LBS | SYSTOLIC BLOOD PRESSURE: 119 MMHG | HEIGHT: 63 IN

## 2021-08-30 VITALS
SYSTOLIC BLOOD PRESSURE: 129 MMHG | HEART RATE: 75 BPM | HEIGHT: 63 IN | BODY MASS INDEX: 31.89 KG/M2 | WEIGHT: 180 LBS | DIASTOLIC BLOOD PRESSURE: 75 MMHG

## 2021-08-30 PROCEDURE — 99213 OFFICE O/P EST LOW 20 MIN: CPT

## 2021-08-30 PROCEDURE — 73610 X-RAY EXAM OF ANKLE: CPT | Mod: RT

## 2021-12-06 ENCOUNTER — APPOINTMENT (OUTPATIENT)
Dept: ORTHOPEDIC SURGERY | Facility: CLINIC | Age: 71
End: 2021-12-06
Payer: MEDICARE

## 2021-12-06 VITALS
HEIGHT: 63 IN | WEIGHT: 125 LBS | BODY MASS INDEX: 22.15 KG/M2 | SYSTOLIC BLOOD PRESSURE: 124 MMHG | HEART RATE: 71 BPM | DIASTOLIC BLOOD PRESSURE: 67 MMHG

## 2021-12-06 PROCEDURE — 99213 OFFICE O/P EST LOW 20 MIN: CPT

## 2021-12-06 PROCEDURE — 73610 X-RAY EXAM OF ANKLE: CPT | Mod: RT

## 2023-01-30 ENCOUNTER — APPOINTMENT (OUTPATIENT)
Dept: ORTHOPEDIC SURGERY | Facility: CLINIC | Age: 73
End: 2023-01-30
Payer: MEDICARE

## 2023-01-30 PROCEDURE — 99213 OFFICE O/P EST LOW 20 MIN: CPT

## 2023-01-30 PROCEDURE — 73610 X-RAY EXAM OF ANKLE: CPT | Mod: RT

## 2023-02-27 ENCOUNTER — APPOINTMENT (OUTPATIENT)
Dept: ORTHOPEDIC SURGERY | Facility: CLINIC | Age: 73
End: 2023-02-27

## 2023-04-26 ENCOUNTER — APPOINTMENT (OUTPATIENT)
Dept: PULMONOLOGY | Facility: CLINIC | Age: 73
End: 2023-04-26
Payer: MEDICARE

## 2023-04-26 VITALS
DIASTOLIC BLOOD PRESSURE: 57 MMHG | BODY MASS INDEX: 20.55 KG/M2 | HEART RATE: 83 BPM | RESPIRATION RATE: 14 BRPM | OXYGEN SATURATION: 97 % | TEMPERATURE: 97.1 F | SYSTOLIC BLOOD PRESSURE: 128 MMHG | WEIGHT: 116 LBS

## 2023-04-26 DIAGNOSIS — F32.A DEPRESSION, UNSPECIFIED: ICD-10-CM

## 2023-04-26 PROCEDURE — 99214 OFFICE O/P EST MOD 30 MIN: CPT

## 2023-04-26 NOTE — HISTORY OF PRESENT ILLNESS
[Never] : never [TextBox_4] : Patient is a 73-year-old female past medical history significant for diabetes, hypertension, hypothyroidism who presents with complaints of cough \par \par \par Patient reports persistent cough with occasional sputum production. She has been coughing for the last 3 weeks and endorses coughing fits to the point where she feels as if she going to vomit. She reports she has been taking OTC cough medication with little relief

## 2023-04-26 NOTE — ASSESSMENT
[FreeTextEntry1] : In summary the patient is a 73-year-old female past medical history significant for diabetes hypertension depression who presents for follow-up.  The patient has suffered from a chronic cough for many years.  Her physical exam is within normal limits.  The patient is started on triple therapy prescription renewal performed.  She is instructed to continue current medications and follow-up in 1 month

## 2023-07-24 ENCOUNTER — RX RENEWAL (OUTPATIENT)
Age: 73
End: 2023-07-24

## 2023-08-11 ENCOUNTER — RX RENEWAL (OUTPATIENT)
Age: 73
End: 2023-08-11

## 2023-08-11 RX ORDER — SITAGLIPTIN 50 MG/1
50 TABLET, FILM COATED ORAL
Qty: 90 | Refills: 1 | Status: ACTIVE | COMMUNITY
Start: 2023-02-01 | End: 1900-01-01

## 2023-09-19 ENCOUNTER — APPOINTMENT (OUTPATIENT)
Dept: OBGYN | Facility: CLINIC | Age: 73
End: 2023-09-19
Payer: MEDICARE

## 2023-09-19 PROCEDURE — 99204 OFFICE O/P NEW MOD 45 MIN: CPT | Mod: 25

## 2023-09-19 PROCEDURE — 76830 TRANSVAGINAL US NON-OB: CPT

## 2023-09-19 PROCEDURE — 76856 US EXAM PELVIC COMPLETE: CPT | Mod: 59

## 2023-09-19 PROCEDURE — 99397 PER PM REEVAL EST PAT 65+ YR: CPT

## 2023-09-27 ENCOUNTER — NON-APPOINTMENT (OUTPATIENT)
Age: 73
End: 2023-09-27

## 2023-10-11 ENCOUNTER — NON-APPOINTMENT (OUTPATIENT)
Age: 73
End: 2023-10-11

## 2023-10-20 ENCOUNTER — APPOINTMENT (OUTPATIENT)
Dept: GYNECOLOGIC ONCOLOGY | Facility: CLINIC | Age: 73
End: 2023-10-20
Payer: MEDICARE

## 2023-10-20 VITALS
SYSTOLIC BLOOD PRESSURE: 146 MMHG | WEIGHT: 112 LBS | DIASTOLIC BLOOD PRESSURE: 66 MMHG | HEIGHT: 62 IN | BODY MASS INDEX: 20.61 KG/M2 | HEART RATE: 81 BPM

## 2023-10-20 DIAGNOSIS — Z80.0 FAMILY HISTORY OF MALIGNANT NEOPLASM OF DIGESTIVE ORGANS: ICD-10-CM

## 2023-10-20 DIAGNOSIS — Z80.43 FAMILY HISTORY OF MALIGNANT NEOPLASM OF TESTIS: ICD-10-CM

## 2023-10-20 PROCEDURE — 99205 OFFICE O/P NEW HI 60 MIN: CPT

## 2023-10-20 RX ORDER — PROMETHAZINE HYDROCHLORIDE AND CODEINE PHOSPHATE 6.25; 1 MG/5ML; MG/5ML
6.25-1 SOLUTION ORAL
Qty: 480 | Refills: 0 | Status: DISCONTINUED | COMMUNITY
Start: 2018-03-29 | End: 2023-10-20

## 2023-10-20 RX ORDER — LEVOTHYROXINE SODIUM 0.1 MG/1
100 TABLET ORAL
Qty: 90 | Refills: 0 | Status: DISCONTINUED | COMMUNITY
Start: 1900-01-01 | End: 2023-10-20

## 2023-10-20 RX ORDER — IBUPROFEN 600 MG/1
600 TABLET, FILM COATED ORAL 4 TIMES DAILY
Qty: 80 | Refills: 2 | Status: DISCONTINUED | COMMUNITY
Start: 2021-03-03 | End: 2023-10-20

## 2023-10-20 RX ORDER — METOPROLOL TARTRATE 25 MG/1
25 TABLET, FILM COATED ORAL
Refills: 0 | Status: DISCONTINUED | COMMUNITY
End: 2023-10-20

## 2023-10-20 RX ORDER — DICLOFENAC SODIUM 75 MG/1
75 TABLET, DELAYED RELEASE ORAL
Qty: 42 | Refills: 2 | Status: DISCONTINUED | COMMUNITY
Start: 2023-01-30 | End: 2023-10-20

## 2023-10-20 RX ORDER — PANTOPRAZOLE 40 MG/1
40 TABLET, DELAYED RELEASE ORAL DAILY
Qty: 30 | Refills: 3 | Status: DISCONTINUED | COMMUNITY
Start: 2017-08-09 | End: 2023-10-20

## 2023-10-20 RX ORDER — PANTOPRAZOLE 40 MG/1
40 TABLET, DELAYED RELEASE ORAL DAILY
Qty: 30 | Refills: 2 | Status: DISCONTINUED | COMMUNITY
Start: 2023-01-30 | End: 2023-10-20

## 2023-10-20 RX ORDER — NYSTATIN AND TRIAMCINOLONE ACETONIDE 100000; 1 MG/G; MG/G
100000-0.1 CREAM TOPICAL TWICE DAILY
Qty: 1 | Refills: 3 | Status: DISCONTINUED | COMMUNITY
Start: 2018-03-29 | End: 2023-10-20

## 2023-10-20 RX ORDER — SITAGLIPTIN AND METFORMIN HYDROCHLORIDE 50; 1000 MG/1; MG/1
50-1000 TABLET, FILM COATED ORAL
Refills: 0 | Status: DISCONTINUED | COMMUNITY
End: 2023-10-20

## 2023-10-20 RX ORDER — OXYCODONE 5 MG/1
5 TABLET ORAL EVERY 6 HOURS
Qty: 20 | Refills: 0 | Status: DISCONTINUED | COMMUNITY
Start: 2021-02-12 | End: 2023-10-20

## 2023-10-20 RX ORDER — AZITHROMYCIN 250 MG/1
250 TABLET, FILM COATED ORAL
Qty: 6 | Refills: 0 | Status: DISCONTINUED | COMMUNITY
Start: 2017-11-15 | End: 2023-10-20

## 2023-10-29 ENCOUNTER — NON-APPOINTMENT (OUTPATIENT)
Age: 73
End: 2023-10-29

## 2023-11-09 ENCOUNTER — APPOINTMENT (OUTPATIENT)
Dept: MRI IMAGING | Facility: CLINIC | Age: 73
End: 2023-11-09
Payer: MEDICARE

## 2023-11-09 ENCOUNTER — OUTPATIENT (OUTPATIENT)
Dept: OUTPATIENT SERVICES | Facility: HOSPITAL | Age: 73
LOS: 1 days | End: 2023-11-09
Payer: MEDICARE

## 2023-11-09 DIAGNOSIS — N13.30 UNSPECIFIED HYDRONEPHROSIS: ICD-10-CM

## 2023-11-09 DIAGNOSIS — Z90.89 ACQUIRED ABSENCE OF OTHER ORGANS: Chronic | ICD-10-CM

## 2023-11-09 DIAGNOSIS — Z90.49 ACQUIRED ABSENCE OF OTHER SPECIFIED PARTS OF DIGESTIVE TRACT: Chronic | ICD-10-CM

## 2023-11-09 DIAGNOSIS — Z98.51 TUBAL LIGATION STATUS: Chronic | ICD-10-CM

## 2023-11-09 PROCEDURE — 72197 MRI PELVIS W/O & W/DYE: CPT

## 2023-11-09 PROCEDURE — 72197 MRI PELVIS W/O & W/DYE: CPT | Mod: 26

## 2023-11-09 PROCEDURE — A9585: CPT

## 2023-11-17 ENCOUNTER — APPOINTMENT (OUTPATIENT)
Dept: INTERNAL MEDICINE | Facility: CLINIC | Age: 73
End: 2023-11-17

## 2023-11-21 ENCOUNTER — TRANSCRIPTION ENCOUNTER (OUTPATIENT)
Age: 73
End: 2023-11-21

## 2023-11-21 ENCOUNTER — APPOINTMENT (OUTPATIENT)
Dept: GYNECOLOGIC ONCOLOGY | Facility: CLINIC | Age: 73
End: 2023-11-21
Payer: MEDICARE

## 2023-11-21 PROCEDURE — 99212 OFFICE O/P EST SF 10 MIN: CPT | Mod: 95

## 2023-11-30 ENCOUNTER — APPOINTMENT (OUTPATIENT)
Dept: INTERNAL MEDICINE | Facility: CLINIC | Age: 73
End: 2023-11-30
Payer: MEDICARE

## 2023-11-30 VITALS
BODY MASS INDEX: 20.3 KG/M2 | WEIGHT: 111 LBS | HEART RATE: 80 BPM | DIASTOLIC BLOOD PRESSURE: 64 MMHG | RESPIRATION RATE: 14 BRPM | SYSTOLIC BLOOD PRESSURE: 116 MMHG | OXYGEN SATURATION: 97 %

## 2023-11-30 DIAGNOSIS — E03.9 HYPOTHYROIDISM, UNSPECIFIED: ICD-10-CM

## 2023-11-30 DIAGNOSIS — Z00.00 ENCOUNTER FOR GENERAL ADULT MEDICAL EXAMINATION W/OUT ABNORMAL FINDINGS: ICD-10-CM

## 2023-11-30 PROCEDURE — G0439: CPT

## 2023-11-30 RX ORDER — METFORMIN HYDROCHLORIDE 1000 MG/1
1000 TABLET, COATED ORAL
Qty: 180 | Refills: 0 | Status: ACTIVE | COMMUNITY

## 2023-12-01 ENCOUNTER — OUTPATIENT (OUTPATIENT)
Dept: OUTPATIENT SERVICES | Facility: HOSPITAL | Age: 73
LOS: 1 days | End: 2023-12-01
Payer: MEDICARE

## 2023-12-01 ENCOUNTER — APPOINTMENT (OUTPATIENT)
Dept: RADIOLOGY | Facility: IMAGING CENTER | Age: 73
End: 2023-12-01
Payer: MEDICARE

## 2023-12-01 DIAGNOSIS — Z98.51 TUBAL LIGATION STATUS: Chronic | ICD-10-CM

## 2023-12-01 DIAGNOSIS — Z90.49 ACQUIRED ABSENCE OF OTHER SPECIFIED PARTS OF DIGESTIVE TRACT: Chronic | ICD-10-CM

## 2023-12-01 DIAGNOSIS — Z90.89 ACQUIRED ABSENCE OF OTHER ORGANS: Chronic | ICD-10-CM

## 2023-12-01 DIAGNOSIS — Z00.8 ENCOUNTER FOR OTHER GENERAL EXAMINATION: ICD-10-CM

## 2023-12-01 LAB
FOLATE SERPL-MCNC: >20 NG/ML
VIT B12 SERPL-MCNC: 683 PG/ML

## 2023-12-01 PROCEDURE — 71046 X-RAY EXAM CHEST 2 VIEWS: CPT

## 2023-12-01 PROCEDURE — 71046 X-RAY EXAM CHEST 2 VIEWS: CPT | Mod: 26

## 2023-12-06 ENCOUNTER — NON-APPOINTMENT (OUTPATIENT)
Age: 73
End: 2023-12-06

## 2023-12-06 LAB
M TB IFN-G BLD-IMP: ABNORMAL
QUANTIFERON TB PLUS MITOGEN MINUS NIL: 0.12 IU/ML
QUANTIFERON TB PLUS NIL: 0.02 IU/ML
QUANTIFERON TB PLUS TB1 MINUS NIL: 0 IU/ML
QUANTIFERON TB PLUS TB2 MINUS NIL: -0.01 IU/ML

## 2024-01-04 ENCOUNTER — APPOINTMENT (OUTPATIENT)
Dept: GYNECOLOGIC ONCOLOGY | Facility: HOSPITAL | Age: 74
End: 2024-01-04

## 2024-01-16 ENCOUNTER — RX RENEWAL (OUTPATIENT)
Age: 74
End: 2024-01-16

## 2024-01-18 ENCOUNTER — APPOINTMENT (OUTPATIENT)
Dept: INTERNAL MEDICINE | Facility: CLINIC | Age: 74
End: 2024-01-18
Payer: MEDICARE

## 2024-01-18 VITALS
HEART RATE: 79 BPM | BODY MASS INDEX: 20.12 KG/M2 | WEIGHT: 110 LBS | DIASTOLIC BLOOD PRESSURE: 61 MMHG | RESPIRATION RATE: 14 BRPM | OXYGEN SATURATION: 97 % | SYSTOLIC BLOOD PRESSURE: 116 MMHG

## 2024-01-18 PROCEDURE — 99213 OFFICE O/P EST LOW 20 MIN: CPT

## 2024-01-18 NOTE — PHYSICAL EXAM
[No Acute Distress] : no acute distress [Normal Voice/Communication] : normal voice/communication [Normal Sclera/Conjunctiva] : normal sclera/conjunctiva [Normal] : normal rate, regular rhythm, normal S1 and S2 and no murmur heard [Soft] : abdomen soft [No HSM] : no HSM [de-identified] : mass  lower abd

## 2024-01-18 NOTE — ASSESSMENT
[FreeTextEntry1] : 1) fever / elevated WBC - could be 2/2 to inflammatory response  - adv to proceed surgery   2)  DM  - reports improved control   3) anemia - post iron infusion - monitor

## 2024-01-18 NOTE — HISTORY OF PRESENT ILLNESS
[FreeTextEntry1] : here for f/u   she has not had the hysterectomy yet ct to have low grade fevers on and off  repeat Quantiferon at onc office neg postt IV iron infusion - Hb 9.6  for DM , started Prandin  reports improved BG  ontrol , latest A1C 8.4  PT  lives alone , very fatigued  imaging of chest / abd/ pelvis again shows a large pelvic mass - possible malignancy

## 2024-01-19 ENCOUNTER — APPOINTMENT (OUTPATIENT)
Dept: GYNECOLOGIC ONCOLOGY | Facility: CLINIC | Age: 74
End: 2024-01-19

## 2024-01-27 ENCOUNTER — EMERGENCY (EMERGENCY)
Facility: HOSPITAL | Age: 74
LOS: 0 days | Discharge: HOME | End: 2024-01-27
Payer: MEDICARE

## 2024-01-27 VITALS
DIASTOLIC BLOOD PRESSURE: 63 MMHG | SYSTOLIC BLOOD PRESSURE: 115 MMHG | RESPIRATION RATE: 19 BRPM | HEART RATE: 71 BPM | TEMPERATURE: 98 F | OXYGEN SATURATION: 99 %

## 2024-01-27 VITALS
HEIGHT: 62 IN | OXYGEN SATURATION: 100 % | DIASTOLIC BLOOD PRESSURE: 63 MMHG | SYSTOLIC BLOOD PRESSURE: 121 MMHG | HEART RATE: 88 BPM | WEIGHT: 110.01 LBS | TEMPERATURE: 101 F | RESPIRATION RATE: 18 BRPM

## 2024-01-27 DIAGNOSIS — E03.9 HYPOTHYROIDISM, UNSPECIFIED: ICD-10-CM

## 2024-01-27 DIAGNOSIS — Z90.89 ACQUIRED ABSENCE OF OTHER ORGANS: Chronic | ICD-10-CM

## 2024-01-27 DIAGNOSIS — Z86.79 PERSONAL HISTORY OF OTHER DISEASES OF THE CIRCULATORY SYSTEM: ICD-10-CM

## 2024-01-27 DIAGNOSIS — R05.9 COUGH, UNSPECIFIED: ICD-10-CM

## 2024-01-27 DIAGNOSIS — R50.9 FEVER, UNSPECIFIED: ICD-10-CM

## 2024-01-27 DIAGNOSIS — Z90.49 ACQUIRED ABSENCE OF OTHER SPECIFIED PARTS OF DIGESTIVE TRACT: Chronic | ICD-10-CM

## 2024-01-27 DIAGNOSIS — E11.9 TYPE 2 DIABETES MELLITUS WITHOUT COMPLICATIONS: ICD-10-CM

## 2024-01-27 DIAGNOSIS — Z91.040 LATEX ALLERGY STATUS: ICD-10-CM

## 2024-01-27 DIAGNOSIS — E78.5 HYPERLIPIDEMIA, UNSPECIFIED: ICD-10-CM

## 2024-01-27 DIAGNOSIS — Z87.42 PERSONAL HISTORY OF OTHER DISEASES OF THE FEMALE GENITAL TRACT: ICD-10-CM

## 2024-01-27 DIAGNOSIS — U07.1 COVID-19: ICD-10-CM

## 2024-01-27 DIAGNOSIS — Z88.1 ALLERGY STATUS TO OTHER ANTIBIOTIC AGENTS STATUS: ICD-10-CM

## 2024-01-27 DIAGNOSIS — I10 ESSENTIAL (PRIMARY) HYPERTENSION: ICD-10-CM

## 2024-01-27 DIAGNOSIS — J06.9 ACUTE UPPER RESPIRATORY INFECTION, UNSPECIFIED: ICD-10-CM

## 2024-01-27 DIAGNOSIS — Z98.51 TUBAL LIGATION STATUS: Chronic | ICD-10-CM

## 2024-01-27 DIAGNOSIS — Z79.84 LONG TERM (CURRENT) USE OF ORAL HYPOGLYCEMIC DRUGS: ICD-10-CM

## 2024-01-27 LAB
ALBUMIN SERPL ELPH-MCNC: 1.9 G/DL — LOW (ref 3.3–5)
ALP SERPL-CCNC: 240 U/L — HIGH (ref 40–120)
ALT FLD-CCNC: 28 U/L — SIGNIFICANT CHANGE UP (ref 12–78)
ANION GAP SERPL CALC-SCNC: 3 MMOL/L — LOW (ref 5–17)
ANISOCYTOSIS BLD QL: SLIGHT — SIGNIFICANT CHANGE UP
AST SERPL-CCNC: 37 U/L — SIGNIFICANT CHANGE UP (ref 15–37)
BASOPHILS # BLD AUTO: 0.09 K/UL — SIGNIFICANT CHANGE UP (ref 0–0.2)
BASOPHILS NFR BLD AUTO: 0.7 % — SIGNIFICANT CHANGE UP (ref 0–2)
BILIRUB SERPL-MCNC: 0.3 MG/DL — SIGNIFICANT CHANGE UP (ref 0.2–1.2)
BUN SERPL-MCNC: 15 MG/DL — SIGNIFICANT CHANGE UP (ref 7–23)
CALCIUM SERPL-MCNC: 9.2 MG/DL — SIGNIFICANT CHANGE UP (ref 8.5–10.1)
CHLORIDE SERPL-SCNC: 100 MMOL/L — SIGNIFICANT CHANGE UP (ref 96–108)
CO2 SERPL-SCNC: 30 MMOL/L — SIGNIFICANT CHANGE UP (ref 22–31)
CREAT SERPL-MCNC: 0.7 MG/DL — SIGNIFICANT CHANGE UP (ref 0.5–1.3)
EGFR: 92 ML/MIN/1.73M2 — SIGNIFICANT CHANGE UP
EOSINOPHIL # BLD AUTO: 0.11 K/UL — SIGNIFICANT CHANGE UP (ref 0–0.5)
EOSINOPHIL NFR BLD AUTO: 0.8 % — SIGNIFICANT CHANGE UP (ref 0–6)
GLUCOSE SERPL-MCNC: 273 MG/DL — HIGH (ref 70–99)
HCT VFR BLD CALC: 29.5 % — LOW (ref 34.5–45)
HGB BLD-MCNC: 9.1 G/DL — LOW (ref 11.5–15.5)
IMM GRANULOCYTES NFR BLD AUTO: 0.8 % — SIGNIFICANT CHANGE UP (ref 0–0.9)
LYMPHOCYTES # BLD AUTO: 1.46 K/UL — SIGNIFICANT CHANGE UP (ref 1–3.3)
LYMPHOCYTES # BLD AUTO: 10.8 % — LOW (ref 13–44)
MANUAL SMEAR VERIFICATION: SIGNIFICANT CHANGE UP
MCHC RBC-ENTMCNC: 23.6 PG — LOW (ref 27–34)
MCHC RBC-ENTMCNC: 30.8 G/DL — LOW (ref 32–36)
MCV RBC AUTO: 76.4 FL — LOW (ref 80–100)
MONOCYTES # BLD AUTO: 0.69 K/UL — SIGNIFICANT CHANGE UP (ref 0–0.9)
MONOCYTES NFR BLD AUTO: 5.1 % — SIGNIFICANT CHANGE UP (ref 2–14)
NEUTROPHILS # BLD AUTO: 11.01 K/UL — HIGH (ref 1.8–7.4)
NEUTROPHILS NFR BLD AUTO: 81.8 % — HIGH (ref 43–77)
NRBC # BLD: 0 /100 WBCS — SIGNIFICANT CHANGE UP (ref 0–0)
PLAT MORPH BLD: NORMAL — SIGNIFICANT CHANGE UP
PLATELET # BLD AUTO: 445 K/UL — HIGH (ref 150–400)
POTASSIUM SERPL-MCNC: 5.4 MMOL/L — HIGH (ref 3.5–5.3)
POTASSIUM SERPL-SCNC: 5.4 MMOL/L — HIGH (ref 3.5–5.3)
PROT SERPL-MCNC: 8.1 GM/DL — SIGNIFICANT CHANGE UP (ref 6–8.3)
RAPID RVP RESULT: DETECTED
RBC # BLD: 3.86 M/UL — SIGNIFICANT CHANGE UP (ref 3.8–5.2)
RBC # FLD: 21.7 % — HIGH (ref 10.3–14.5)
RBC BLD AUTO: ABNORMAL
SARS-COV-2 RNA SPEC QL NAA+PROBE: DETECTED
SODIUM SERPL-SCNC: 133 MMOL/L — LOW (ref 135–145)
WBC # BLD: 13.47 K/UL — HIGH (ref 3.8–10.5)
WBC # FLD AUTO: 13.47 K/UL — HIGH (ref 3.8–10.5)

## 2024-01-27 PROCEDURE — 71046 X-RAY EXAM CHEST 2 VIEWS: CPT | Mod: 26

## 2024-01-27 PROCEDURE — 99284 EMERGENCY DEPT VISIT MOD MDM: CPT

## 2024-01-27 RX ORDER — ONDANSETRON 8 MG/1
4 TABLET, FILM COATED ORAL ONCE
Refills: 0 | Status: COMPLETED | OUTPATIENT
Start: 2024-01-27 | End: 2024-01-27

## 2024-01-27 RX ORDER — ACETAMINOPHEN 500 MG
750 TABLET ORAL ONCE
Refills: 0 | Status: COMPLETED | OUTPATIENT
Start: 2024-01-27 | End: 2024-01-27

## 2024-01-27 RX ORDER — SODIUM CHLORIDE 9 MG/ML
1000 INJECTION INTRAMUSCULAR; INTRAVENOUS; SUBCUTANEOUS ONCE
Refills: 0 | Status: COMPLETED | OUTPATIENT
Start: 2024-01-27 | End: 2024-01-27

## 2024-01-27 RX ADMIN — Medication 750 MILLIGRAM(S): at 12:27

## 2024-01-27 RX ADMIN — SODIUM CHLORIDE 1000 MILLILITER(S): 9 INJECTION INTRAMUSCULAR; INTRAVENOUS; SUBCUTANEOUS at 11:44

## 2024-01-27 RX ADMIN — Medication 300 MILLIGRAM(S): at 11:44

## 2024-01-27 RX ADMIN — ONDANSETRON 4 MILLIGRAM(S): 8 TABLET, FILM COATED ORAL at 11:44

## 2024-01-27 NOTE — ED PROVIDER NOTE - PROGRESS NOTE DETAILS
LALA Desai: Workup reviewed - Labs WNL/not actionable at this time, CXR w/ no obvious infiltrates or consolidations. Results endorsed including unexpected incidental findings (copy of reports provided to patient). Shared Decision Making - Reassessment performed, RVP pending, pt w/ improvement of symptoms and fever w/ IVF/medications, pt comfortable upon reevaluation, no acute distress, breathing comfortably, PO challenge passed in ED / pt ate full meal w/o reproduction of nausea/vomiting. Patient is medically stable for discharge. Strict return precautions given, discussed red flag signs/symptoms. Patient to follow up with PMD. Patient/parent displays understanding and agreeable with plan, comfortable with discharge plan home.

## 2024-01-27 NOTE — ED PROVIDER NOTE - CONSTITUTIONAL, MLM
Well appearing, awake, alert, oriented to person, place, time/situation and in no apparent distress, speaking in clear full sentences. normal...

## 2024-01-27 NOTE — ED PROVIDER NOTE - NSFOLLOWUPINSTRUCTIONS_ED_ALL_ED_FT
Follow-up with your Primary Care Physician within the next week.    Medications  - Take Tylenol (Acetaminophen) 650 mg every 4-6 hours AND/OR Motrin (Ibuprofen) 600 mg every 6-8 hours as needed for pain/fever.    Advance activity as tolerated.  Continue all previously prescribed medications as directed unless otherwise instructed.  Follow up with your primary care physician in 48-72 hours- bring copies of your results.  Return to the ER for worsening or persistent symptoms, and/or ANY NEW OR CONCERNING SYMPTOMS such as fever, chest pain, shortness of breath, abdominal pain, intractable nausea/vomiting, lightheadedness/dizziness, neck pain/stiffness, vision changes/blurred vision, passing out/fainting, or headaches. If you have issues obtaining follow up, please call: [phone] to obtain a doctor or specialist who takes your insurance in your area.  You may call [phone] to make an appointment with the internal medicine clinic.    An upper respiratory infection (URI) is a common viral infection of the nose, throat, and upper air passages that lead to the lungs. The most common type of URI is the common cold. URIs usually get better on their own, without medical treatment.    What are the causes?  A URI is caused by a virus. You may catch a virus by:    Breathing in droplets from an infected person's cough or sneeze.  Touching something that has been exposed to the virus (contaminated) and then touching your mouth, nose, or eyes.    What increases the risk?  You are more likely to get a URI if:    You are very young or very old.  It is wiley or winter.  You have close contact with others, such as at a , school, or health care facility.  You smoke.  You have long-term (chronic) heart or lung disease.  You have a weakened disease-fighting (immune) system.  You have nasal allergies or asthma.  You are experiencing a lot of stress.  You work in an area that has poor air circulation.  You have poor nutrition.    What are the signs or symptoms?  A URI usually involves some of the following symptoms:    Runny or stuffy (congested) nose.  Sneezing.  Cough.  Sore throat.  Headache.  Fatigue.  Fever.  Loss of appetite.  Pain in your forehead, behind your eyes, and over your cheekbones (sinus pain).  Muscle aches.  Redness or irritation of the eyes.  Pressure in the ears or face.    How is this diagnosed?  This condition may be diagnosed based on your medical history and symptoms, and a physical exam. Your health care provider may use a cotton swab to take a mucus sample from your nose (nasal swab). This sample can be tested to determine what virus is causing the illness.    How is this treated?  URIs usually get better on their own within 7–10 days. You can take steps at home to relieve your symptoms. Medicines cannot cure URIs, but your health care provider may recommend certain medicines to help relieve symptoms, such as:    Over-the-counter cold medicines.  Cough suppressants. Coughing is a type of defense against infection that helps to clear the respiratory system, so take these medicines only as recommended by your health care provider.  Fever-reducing medicines.    Follow these instructions at home:    Activity    Rest as needed.  If you have a fever, stay home from work or school until your fever is gone or until your health care provider says you are no longer contagious. Your health care provider may have you wear a face mask to prevent your infection from spreading.    Relieving symptoms    Gargle with a salt-water mixture 3–4 times a day or as needed. To make a salt-water mixture, completely dissolve ½–1 tsp of salt in 1 cup of warm water.  Use a cool-mist humidifier to add moisture to the air. This can help you breathe more easily.    Eating and drinking     Drink enough fluid to keep your urine pale yellow.  Eat soups and other clear broths.    General instructions     Take over-the-counter and prescription medicines only as told by your health care provider. These include cold medicines, fever reducers, and cough suppressants.  Do not use any products that contain nicotine or tobacco, such as cigarettes and e-cigarettes. If you need help quitting, ask your health care provider.   Stay away from secondhand smoke.  Stay up to date on all immunizations, including the yearly (annual) flu vaccine.  Keep all follow-up visits as told by your health care provider. This is important.        How to prevent the spread of infection to others     URIs can be passed from person to person (are contagious). To prevent the infection from spreading:    Wash your hands often with soap and water. If soap and water are not available, use hand .  Avoid touching your mouth, face, eyes, or nose.  Cough or sneeze into a tissue or your sleeve or elbow instead of into your hand or into the air.    Contact a health care provider if:  You are getting worse instead of better.  You have a fever or chills.  Your mucus is brown or red.  You have yellow or brown discharge coming from your nose.  You have pain in your face, especially when you bend forward.  You have swollen neck glands.  You have pain while swallowing.  You have white areas in the back of your throat.    Get help right away if:  You have shortness of breath that gets worse.  You have severe or persistent:    Headache.  Ear pain.  Sinus pain.  Chest pain.  You have chronic lung disease along with any of the following:    Wheezing.  Prolonged cough.  Coughing up blood.  A change in your usual mucus.  You have a stiff neck.  You have changes in your:    Vision.  Hearing.  Thinking.  Mood.    Summary  An upper respiratory infection (URI) is a common infection of the nose, throat, and upper air passages that lead to the lungs.  A URI is caused by a virus.  URIs usually get better on their own within 7–10 days.  Medicines cannot cure URIs, but your health care provider may recommend certain medicines to help relieve symptoms.    ADDITIONAL NOTES AND INSTRUCTIONS    Please follow up with your Primary MD in 24-48 hr.  Seek immediate medical care for any new/worsening signs or symptoms.

## 2024-01-27 NOTE — ED PROVIDER NOTE - IV ALTEPLASE EXCL REL HIDDEN
Prior auth form received from [facility] [employer] for the lyrica, form placed at triage bin for fup. show

## 2024-01-27 NOTE — ED PROVIDER NOTE - PATIENT PORTAL LINK FT
You can access the FollowMyHealth Patient Portal offered by [facility] by registering at the following website: [email or web address]. By joining [employer]’s FollowMyHealth portal, you will also be able to view your health information using other applications (apps) compatible with our system.

## 2024-01-27 NOTE — ED PROVIDER NOTE - CLINICAL SUMMARY MEDICAL DECISION MAKING FREE TEXT BOX
73F w/ PMH DM2, HTN, HLD, Hypothyroidism, Palpitations, Fibroids who presents to ED w/ fever x 4 days associated w/ cough, nasal congestion/runny nose. Pt has NOT been taking OTC medications for fever/relief. Pt states this morning she additionally experienced nausea and 1 episode of NBNB vomiting w/ NB diarrhea. + Ill contact, older son w/ similar URI symptoms. No recent travel. Denies chills, chest pain, shortness of breath, abdominal pain, diarrhea, dysuria, urinary frequency/urgency, extremity weakness/numbness/tingling, lightheadedness, dizziness, or headaches. Patient currently febrile 101.2F, hemodynamically stable, spO2 100%. On PE - pt well-appearing, in no acute distress, heart w/ RRR, chest symmetrical, non-labored breathing, lungs clear bilaterally, abdomen soft/non-distended/non-tender to palpation, no focal neurological deficits. Based on history and physical, differentials include but are not limited to viral illness, COVID/Flu, viral vs. bacterial gastroenteritis, electrolyte abnormality, anemia, pneumonia. Plan to assess patient for acute pathology as listed above with Labs, EKG. Will administer medications for symptomatic relief, follow-up on results, and reassess. Disposition pending workup/re-evaluation.

## 2024-01-27 NOTE — ED ADULT NURSE NOTE - JUGULAR VENOUS DISTENTION
"  Problem: Adult Behavioral Health Plan of Care  Goal: Plan of Care Review  Outcome: No Change     S: Admitted a 70 year old male from [facility] ED presenting with decompensation of Schizophrenia.    B: Pt currently under commitment and rocha, was discharged on  from [facility]. Pt was seen by his [facility] psychiatrist today in his home, and Pt was refusing to take medications and the psychiatrist placed Pt on a transport hold for transport to ED. Pt is yelling and disorganized in ED, has the TV volume all the way up, is speaking fast and repetitive. Pt hx of Schizophrenia diagnosis. Pt has been verbally agitated but has not been physically aggressive or violent. Pt apartment was found to be in disarray, Pt has been yelling at neighbors and throwing trash out of his apartment. Pt was given IM zyprexa to calm him in ED.    A: Pt is placed on a 72 Hour Emergency Hold which started on 2019 at 1547 and will  on 2019 at 1547.    Upon admission to the unit, Psych associates were able to do the search and assisted him to his bedroom. Writer was unable to do the Admission profile with him since pt is disorganized, confused, angry, restless and was yelling with a slurred speech telling writer to shut the door. Later, was observed hallucinating-saying the \"YAHIR are coming.\" Left him safe in his bedroom. Placed a call to the on-call MD and obtained medication orders. No labs ordered at this time.   Per ED report, he was placed in restraints and was given PRN Zyprexa before he was sent here at [facility].   Per ED, he smoked 1 pack of cigarette a day and was found with 2 bottles of whiskey in his apartment. Utox result was negative.  On-call MD didn't order Invega on admission.    R:  Evaluation and Treatment. Will continue to monitor pt. Endorsed to NOC shift.  " absent

## 2024-01-27 NOTE — ED ADULT NURSE NOTE - NSFALLHARMRISKINTERV_ED_ALL_ED

## 2024-01-27 NOTE — ED PROVIDER NOTE - CROS ED ROS STATEMENT
all other ROS negative except as per HPI Procedure To Be Performed At Next Visit: Cryotherapy Detail Level: Zone Introduction Text (Please End With A Colon): The following procedure was deferred:

## 2024-01-27 NOTE — ED ADULT TRIAGE NOTE - CHIEF COMPLAINT QUOTE
pt AAO x 3 ambulatory with steady gait c/o 4-5 days fever cough and this AM nausea and 1 episode of vomiting. pt with hx large fibroids and needing a hysterectomy.

## 2024-01-27 NOTE — ED ADULT NURSE NOTE - OBJECTIVE STATEMENT
Pt presents to the ed c/o fever, chills, cough and generalized weakness x yesterday. pt was noted to have dry unproductive cough, clear b/l breath sounds noted. Pt was noted to have +3b/l RLE edema and +2LLE edema. Pt reports generalized weakness, pt is noted to be frail, able to move all extremities, no unilateral weakness noted, no slurred speech or facial droop. Pt denies chest pain and reports sick contact with son.

## 2024-01-27 NOTE — ED PROVIDER NOTE - RESPIRATORY, MLM
Chest symmetrical, non-labored breathing, breath sounds clear and equal bilaterally. No retractions, no use of accessory muscles.

## 2024-01-28 ENCOUNTER — EMERGENCY (EMERGENCY)
Facility: HOSPITAL | Age: 74
LOS: 1 days | End: 2024-01-28
Payer: MEDICARE

## 2024-01-28 ENCOUNTER — NON-APPOINTMENT (OUTPATIENT)
Age: 74
End: 2024-01-28

## 2024-01-28 VITALS
SYSTOLIC BLOOD PRESSURE: 129 MMHG | TEMPERATURE: 100 F | WEIGHT: 110.01 LBS | HEART RATE: 89 BPM | RESPIRATION RATE: 18 BRPM | HEIGHT: 62 IN | DIASTOLIC BLOOD PRESSURE: 66 MMHG | OXYGEN SATURATION: 97 %

## 2024-01-28 DIAGNOSIS — Z90.49 ACQUIRED ABSENCE OF OTHER SPECIFIED PARTS OF DIGESTIVE TRACT: Chronic | ICD-10-CM

## 2024-01-28 DIAGNOSIS — Z98.51 TUBAL LIGATION STATUS: Chronic | ICD-10-CM

## 2024-01-28 PROCEDURE — L9992: CPT

## 2024-01-29 ENCOUNTER — NON-APPOINTMENT (OUTPATIENT)
Age: 74
End: 2024-01-29

## 2024-01-29 ENCOUNTER — APPOINTMENT (OUTPATIENT)
Dept: GYNECOLOGIC ONCOLOGY | Facility: CLINIC | Age: 74
End: 2024-01-29

## 2024-01-30 ENCOUNTER — EMERGENCY (EMERGENCY)
Facility: HOSPITAL | Age: 74
LOS: 0 days | Discharge: HOME | End: 2024-01-31
Attending: STUDENT IN AN ORGANIZED HEALTH CARE EDUCATION/TRAINING PROGRAM
Payer: MEDICARE

## 2024-01-30 VITALS
OXYGEN SATURATION: 99 % | HEART RATE: 98 BPM | RESPIRATION RATE: 16 BRPM | SYSTOLIC BLOOD PRESSURE: 147 MMHG | WEIGHT: 110.01 LBS | HEIGHT: 62 IN | TEMPERATURE: 100 F | DIASTOLIC BLOOD PRESSURE: 82 MMHG

## 2024-01-30 DIAGNOSIS — U07.1 COVID-19: ICD-10-CM

## 2024-01-30 DIAGNOSIS — E78.5 HYPERLIPIDEMIA, UNSPECIFIED: ICD-10-CM

## 2024-01-30 DIAGNOSIS — R53.1 WEAKNESS: ICD-10-CM

## 2024-01-30 DIAGNOSIS — E11.9 TYPE 2 DIABETES MELLITUS WITHOUT COMPLICATIONS: ICD-10-CM

## 2024-01-30 DIAGNOSIS — R05.8 OTHER SPECIFIED COUGH: ICD-10-CM

## 2024-01-30 DIAGNOSIS — Z90.49 ACQUIRED ABSENCE OF OTHER SPECIFIED PARTS OF DIGESTIVE TRACT: Chronic | ICD-10-CM

## 2024-01-30 DIAGNOSIS — E03.9 HYPOTHYROIDISM, UNSPECIFIED: ICD-10-CM

## 2024-01-30 DIAGNOSIS — Z88.1 ALLERGY STATUS TO OTHER ANTIBIOTIC AGENTS STATUS: ICD-10-CM

## 2024-01-30 DIAGNOSIS — Z90.89 ACQUIRED ABSENCE OF OTHER ORGANS: Chronic | ICD-10-CM

## 2024-01-30 DIAGNOSIS — R50.9 FEVER, UNSPECIFIED: ICD-10-CM

## 2024-01-30 DIAGNOSIS — Z91.040 LATEX ALLERGY STATUS: ICD-10-CM

## 2024-01-30 DIAGNOSIS — I10 ESSENTIAL (PRIMARY) HYPERTENSION: ICD-10-CM

## 2024-01-30 DIAGNOSIS — Z98.51 TUBAL LIGATION STATUS: Chronic | ICD-10-CM

## 2024-01-30 LAB
ALBUMIN SERPL ELPH-MCNC: 1.9 G/DL — LOW (ref 3.3–5)
ALP SERPL-CCNC: 226 U/L — HIGH (ref 40–120)
ALT FLD-CCNC: 25 U/L — SIGNIFICANT CHANGE UP (ref 12–78)
ANION GAP SERPL CALC-SCNC: 5 MMOL/L — SIGNIFICANT CHANGE UP (ref 5–17)
AST SERPL-CCNC: 37 U/L — SIGNIFICANT CHANGE UP (ref 15–37)
BASOPHILS # BLD AUTO: 0.06 K/UL — SIGNIFICANT CHANGE UP (ref 0–0.2)
BASOPHILS NFR BLD AUTO: 0.5 % — SIGNIFICANT CHANGE UP (ref 0–2)
BILIRUB SERPL-MCNC: 0.3 MG/DL — SIGNIFICANT CHANGE UP (ref 0.2–1.2)
BUN SERPL-MCNC: 15 MG/DL — SIGNIFICANT CHANGE UP (ref 7–23)
CALCIUM SERPL-MCNC: 9.3 MG/DL — SIGNIFICANT CHANGE UP (ref 8.5–10.1)
CHLORIDE SERPL-SCNC: 100 MMOL/L — SIGNIFICANT CHANGE UP (ref 96–108)
CO2 SERPL-SCNC: 26 MMOL/L — SIGNIFICANT CHANGE UP (ref 22–31)
CREAT SERPL-MCNC: 0.69 MG/DL — SIGNIFICANT CHANGE UP (ref 0.5–1.3)
EGFR: 93 ML/MIN/1.73M2 — SIGNIFICANT CHANGE UP
EOSINOPHIL # BLD AUTO: 0.16 K/UL — SIGNIFICANT CHANGE UP (ref 0–0.5)
EOSINOPHIL NFR BLD AUTO: 1.4 % — SIGNIFICANT CHANGE UP (ref 0–6)
GLUCOSE SERPL-MCNC: 157 MG/DL — HIGH (ref 70–99)
HCT VFR BLD CALC: 29.4 % — LOW (ref 34.5–45)
HGB BLD-MCNC: 8.7 G/DL — LOW (ref 11.5–15.5)
IMM GRANULOCYTES NFR BLD AUTO: 1.2 % — HIGH (ref 0–0.9)
LACTATE SERPL-SCNC: 1.5 MMOL/L — SIGNIFICANT CHANGE UP (ref 0.7–2)
LYMPHOCYTES # BLD AUTO: 19.4 % — SIGNIFICANT CHANGE UP (ref 13–44)
LYMPHOCYTES # BLD AUTO: 2.27 K/UL — SIGNIFICANT CHANGE UP (ref 1–3.3)
MCHC RBC-ENTMCNC: 22.8 PG — LOW (ref 27–34)
MCHC RBC-ENTMCNC: 29.6 G/DL — LOW (ref 32–36)
MCV RBC AUTO: 77.2 FL — LOW (ref 80–100)
MONOCYTES # BLD AUTO: 0.52 K/UL — SIGNIFICANT CHANGE UP (ref 0–0.9)
MONOCYTES NFR BLD AUTO: 4.4 % — SIGNIFICANT CHANGE UP (ref 2–14)
NEUTROPHILS # BLD AUTO: 8.57 K/UL — HIGH (ref 1.8–7.4)
NEUTROPHILS NFR BLD AUTO: 73.1 % — SIGNIFICANT CHANGE UP (ref 43–77)
NRBC # BLD: 0 /100 WBCS — SIGNIFICANT CHANGE UP (ref 0–0)
PLATELET # BLD AUTO: 457 K/UL — HIGH (ref 150–400)
POTASSIUM SERPL-MCNC: 5.1 MMOL/L — SIGNIFICANT CHANGE UP (ref 3.5–5.3)
POTASSIUM SERPL-SCNC: 5.1 MMOL/L — SIGNIFICANT CHANGE UP (ref 3.5–5.3)
PROT SERPL-MCNC: 8.1 GM/DL — SIGNIFICANT CHANGE UP (ref 6–8.3)
RBC # BLD: 3.81 M/UL — SIGNIFICANT CHANGE UP (ref 3.8–5.2)
RBC # FLD: 21.9 % — HIGH (ref 10.3–14.5)
SODIUM SERPL-SCNC: 131 MMOL/L — LOW (ref 135–145)
TROPONIN I, HIGH SENSITIVITY RESULT: 9.4 NG/L — SIGNIFICANT CHANGE UP
WBC # BLD: 11.72 K/UL — HIGH (ref 3.8–10.5)
WBC # FLD AUTO: 11.72 K/UL — HIGH (ref 3.8–10.5)

## 2024-01-30 PROCEDURE — 99285 EMERGENCY DEPT VISIT HI MDM: CPT

## 2024-01-30 PROCEDURE — 71045 X-RAY EXAM CHEST 1 VIEW: CPT | Mod: 26

## 2024-01-30 RX ORDER — SODIUM CHLORIDE 9 MG/ML
1000 INJECTION INTRAMUSCULAR; INTRAVENOUS; SUBCUTANEOUS ONCE
Refills: 0 | Status: COMPLETED | OUTPATIENT
Start: 2024-01-30 | End: 2024-01-30

## 2024-01-30 RX ORDER — INSULIN GLARGINE 100 [IU]/ML
10 INJECTION, SOLUTION SUBCUTANEOUS ONCE
Refills: 0 | Status: COMPLETED | OUTPATIENT
Start: 2024-01-30 | End: 2024-01-30

## 2024-01-30 RX ORDER — RITONAVIR 100 MG/1
100 TABLET, FILM COATED ORAL
Refills: 0 | Status: DISCONTINUED | OUTPATIENT
Start: 2024-01-30 | End: 2024-01-31

## 2024-01-30 RX ADMIN — SODIUM CHLORIDE 1000 MILLILITER(S): 9 INJECTION INTRAMUSCULAR; INTRAVENOUS; SUBCUTANEOUS at 21:08

## 2024-01-30 NOTE — ED PROVIDER NOTE - NSFOLLOWUPINSTRUCTIONS_ED_ALL_ED_FT
Rest, drink plenty of fluids  Advance activity as tolerated  Continue all previously prescribed medications as directed  Follow up with your PMD - bring copies of your results  Return to the ER for chest pain, difficulty breathing, worsening symptoms, or other new or concerning symptoms

## 2024-01-30 NOTE — ED PROVIDER NOTE - PROGRESS NOTE DETAILS
Oviedo DO: pt signed out to me pending evaluation by hospitalist with initial plan for observation due to reported persistent generalized weakness in setting of fever, covid infcn, and chronic anemia. Pt reports fevers ongoing x 1 year with progressive generalized weakness x 6 months and decreased po intake, evaluated outpt by multiple physicians including hematologist whom she follows with at [facility] - pt reports no findings to explain her symptoms. She does feel that fevers more persistent/ constant over last 10 days, dx with covid19 and on day 3 of paxlovid. Pt requesting paxlovid dose, night time lantus, and waiting until morning to speak w/ Social work as she feels she has inadequate care at home and lives alone. Pt on room air with normal respirations, normal work of breathing, cxr without infiltrates. Due to unexplained fever, will obtain bcx but no overt sepsis - d/w pt unlikely to reveal cause of chronic fevers but may be useful in setting of acute fever. will hold until SW discussion in AM. Pt and son agreeable with this plan. Hugo: Signed out to me pending SW evaluation, patient seen by SW and plan for DC.  Awaiting .

## 2024-01-30 NOTE — ED PROVIDER NOTE - OBJECTIVE STATEMENT
73F PMH HTN, HLD, DM, hypothyroid, palpitations, uterine fibroids, COVID+ 1/27 BIB son d/t worsening weakness. Per son, spoke w/ pt Endocrine & Heme who advised bring pt to ED for 'stronger treatment' of COVID and for diagnosis of cough x months. Pt on day 3 Paxlovid. Per son, BGL have been up / down. Pt w/ persistent fever, dry cough, nausea and w/ onset watery NB diarrhea. Per son, pt close to falling d/t weakness, concerned for her safety at home. Denies h/a, CP, SOB, vomiting, constipation, dyusia, LE swelling. Pt incontinent at baseline 2/2 uterine fibroids.

## 2024-01-30 NOTE — ED PROVIDER NOTE - CLINICAL SUMMARY MEDICAL DECISION MAKING FREE TEXT BOX
73F PMH HTN, HLD, DM, hypothyroid, palpitations, uterine fibroids, COVID+ 1/27 BIB son d/t worsening weakness. Afebrile, VSS. Pt in NAD. Plan for ECG, CXR, trop, CBC, CMP, lactate. Give IVF. Re-eval. 73F PMH HTN, HLD, DM, hypothyroid, palpitations, uterine fibroids, COVID+ 1/27 BIB son d/t worsening weakness. Afebrile, VSS. Pt in NAD. Plan for ECG, CXR, trop, CBC, CMP, lactate. Give IVF. Re-eval.  W/u significant for: + mild leukocytosis to 11.7 (downtrending from prior value 13.4)(immature granulocyte 1.2%), Hgb 8.7, BUN/Cr WNL. CXR w/o acute pathology. On re-eval, pt w/ persistent malaise. Pt and son requesting admission.

## 2024-01-30 NOTE — CHART NOTE - NSCHARTNOTEFT_GEN_A_CORE
Called for admission by ER physician, Dr. Brar. Patient seen and examined at bedside with son present. Chart reviewed. Labs stable. Patient reports she has been having intermittent fevers and generalized weakness/malaise for the past year. Has undergone pan-scan and extensive testing with her outpatient hematologist, Dr. Aguero. Ultimately, her hematologist advised her the intermittent fevers may be coming from her uterine fibroids. Patient is scheduled for fibroid removal in May 2024. About two days ago, she started coughing so she came to the ER and found to be COVID positive. Started on paxlovid and discharged home. States she has lived at home alone for the past two years since her  passed away. 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 Requesting assistance at home. Patient states she would like to see a  in the morning to arrange for home care. Offered observation to the hospital for PT evaluation but patient declined and states she simply wants home care for assistance around the house. Discussed aforementioned with covering ER physician, Dr. Oviedo. Called for admission by ER physician, Dr. Brar. Patient seen and examined at bedside with son present. Chart reviewed. Labs stable. Patient reports she has been having intermittent fevers and generalized weakness/malaise for the past year. Has undergone pan-scan and extensive testing with her outpatient hematologist, Dr. Aguero. Ultimately, her hematologist advised her the intermittent fevers may be coming from her uterine fibroids. Patient is scheduled for fibroid removal in May 2024. About two days ago, she started coughing so she came to the ER and found to be COVID positive. Started on paxlovid and discharged home. States she has lived at home alone for the past two years since her  passed away. Used to work as an ICU RN and retired four years ago. Reports she was previously more independent. Currently requesting assistance at home. Patient states she would like to see a  in the morning to arrange for home care. Offered observation to the hospital for PT evaluation but patient declined and states she simply wants home care for assistance around the house. Discussed aforementioned with covering ER physician, Dr. Oviedo.

## 2024-01-30 NOTE — ED PROVIDER NOTE - PATIENT PORTAL LINK FT
You can access the FollowMyHealth Patient Portal offered by [facility] by registering at the following website: [email or web address]. By joining [employer]’s FollowMyHealth portal, you will also be able to view your health information using other applications (apps) compatible with our system.

## 2024-01-30 NOTE — ED ADULT NURSE NOTE - HOW OFTEN DO YOU HAVE A DRINK CONTAINING ALCOHOL?
Called Mr Bryatn due to Merlin home monitor isn't reading.  Mr Bryant has moved and he doesn't have a home phone line.  Sending cell adapter to him.  Updated address.   
Never

## 2024-01-30 NOTE — ED ADULT NURSE NOTE - OBJECTIVE STATEMENT
pt c/o having cough fever and weakness x8 days on plaxovid completed three doses. pt returned to [facility]ED for the same symptoms and stated her symptoms are getting worse. pt was recommendation from her hematologist to return to ER if her symptoms persist.   PMJUNIOR DM

## 2024-01-30 NOTE — ED ADULT NURSE NOTE - MODE OF DISCHARGE
History and physical documented and up to date, allergies reviewed, lab results reviewed, pre-procedure education provided, patient verbalized understanding of procedure, procedural consent signed and patient is NPO. Ambulatory

## 2024-01-30 NOTE — ED ADULT TRIAGE NOTE - CHIEF COMPLAINT QUOTE
pt AAO x 3 ambulatory with steady gait c/o having cough fever weakness x 8 days  on plaxovid  3 doses completed, pt returns for same symptoms getting worse on recommendation of her hematologist pt returns to ER

## 2024-01-31 ENCOUNTER — APPOINTMENT (OUTPATIENT)
Dept: INTERNAL MEDICINE | Facility: CLINIC | Age: 74
End: 2024-01-31
Payer: MEDICARE

## 2024-01-31 ENCOUNTER — NON-APPOINTMENT (OUTPATIENT)
Age: 74
End: 2024-01-31

## 2024-01-31 VITALS
TEMPERATURE: 98 F | DIASTOLIC BLOOD PRESSURE: 60 MMHG | OXYGEN SATURATION: 99 % | RESPIRATION RATE: 15 BRPM | SYSTOLIC BLOOD PRESSURE: 112 MMHG | HEART RATE: 77 BPM

## 2024-01-31 DIAGNOSIS — D64.9 ANEMIA, UNSPECIFIED: ICD-10-CM

## 2024-01-31 PROCEDURE — 99213 OFFICE O/P EST LOW 20 MIN: CPT | Mod: 95

## 2024-01-31 RX ORDER — ACETAMINOPHEN 500 MG
975 TABLET ORAL ONCE
Refills: 0 | Status: COMPLETED | OUTPATIENT
Start: 2024-01-31 | End: 2024-01-31

## 2024-01-31 RX ADMIN — INSULIN GLARGINE 10 UNIT(S): 100 INJECTION, SOLUTION SUBCUTANEOUS at 00:31

## 2024-01-31 RX ADMIN — Medication 975 MILLIGRAM(S): at 07:07

## 2024-01-31 RX ADMIN — Medication 975 MILLIGRAM(S): at 03:43

## 2024-01-31 RX ADMIN — RITONAVIR 100 MILLIGRAM(S): 100 TABLET, FILM COATED ORAL at 06:32

## 2024-01-31 NOTE — ASSESSMENT
[FreeTextEntry1] : 1) fever - etiology unclear  - repeat QuantiFERON neg  - blood cultures are pending  - imaging neg by hx  - neoplastic vs ID  - ID consult , number for pat access given  2) anemia  post iron infusion  - start PO iron  3) ? CLL  - try to get report from [facility] - DR Mota - [phone]-- LM

## 2024-01-31 NOTE — PHYSICAL EXAM
[No Acute Distress] : no acute distress [No Respiratory Distress] : no respiratory distress  [No Rash] : no rash

## 2024-01-31 NOTE — HISTORY OF PRESENT ILLNESS
[FreeTextEntry1] : tele visit f using Telehealth software  consent obtained  pt is at home and physician in office  pt is accompanied by son   pt reports that although she has had low grade intermittent fever for some months, she has been having high grade fever x 10 days was dx w/ Covid 3 days ago , started on Paxlovid , but went to ED last night bc cough  imaging neg / vital stable, and she was discharged c/o body aches , no vomiting ( except once 1 week ago ) no dysuria , no headache , neuro complaints she reports that her main issue ct to be cough the pt has not had sx for pelvis mass yet  reviewed gyn note - he would like better DM control and possible colonoscopy before proceeding the pt is also seeing heme and per son , was told that she has CLL - [facility]- apparently not needing tx at present

## 2024-02-05 LAB
CULTURE RESULTS: SIGNIFICANT CHANGE UP
CULTURE RESULTS: SIGNIFICANT CHANGE UP
SPECIMEN SOURCE: SIGNIFICANT CHANGE UP
SPECIMEN SOURCE: SIGNIFICANT CHANGE UP

## 2024-02-09 ENCOUNTER — APPOINTMENT (OUTPATIENT)
Dept: INFECTIOUS DISEASE | Facility: CLINIC | Age: 74
End: 2024-02-09
Payer: MEDICARE

## 2024-02-09 VITALS
SYSTOLIC BLOOD PRESSURE: 126 MMHG | HEART RATE: 88 BPM | BODY MASS INDEX: 19.2 KG/M2 | TEMPERATURE: 97.3 F | OXYGEN SATURATION: 100 % | DIASTOLIC BLOOD PRESSURE: 70 MMHG | WEIGHT: 105 LBS

## 2024-02-09 PROCEDURE — 99204 OFFICE O/P NEW MOD 45 MIN: CPT

## 2024-02-09 NOTE — CONSULT LETTER
[Dear  ___] : Dear  [unfilled], [Consult Letter:] : I had the pleasure of evaluating your patient, [unfilled]. [Please see my note below.] : Please see my note below. [Consult Closing:] : Thank you very much for allowing me to participate in the care of this patient.  If you have any questions, please do not hesitate to contact me. [Sincerely,] : Sincerely, [FreeTextEntry2] : Dr. Merly Cárdenas [FreeTextEntry3] :  Hermelinda Birmingham MD  of Medicine Division of Infectious Diseases [facility] [employer] [facility] [employer] [address] [place] [address] Tel: [phone] Fax: [phone] [DrAdrienne  ___] : Dr. MARTINEZ

## 2024-02-09 NOTE — PHYSICAL EXAM
[FreeTextEntry1] : weak, non-toxic [Sclera] : the sclera and conjunctiva were normal [Extraocular Movements] : extraocular movements were intact [Outer Ear] : the ears and nose were normal in appearance [Hearing Threshold Finger Rub Not [place]] : hearing was normal [Neck Appearance] : the appearance of the neck was normal [Neck Cervical Mass (___cm)] : no neck mass was observed [Jugular Venous Distention Increased] : there was no jugular-venous distention [Thyroid Diffuse Enlargement] : the thyroid was not enlarged [] : no respiratory distress [Auscultation Breath Sounds / Voice Sounds] : lungs were clear to auscultation bilaterally [Heart Rate And Rhythm] : heart rate was normal and rhythm regular [Heart Sounds] : normal S1 and S2 [Heart Sounds Gallop] : no gallops [Murmurs] : no murmurs [Heart Sounds Pericardial Friction Rub] : no pericardial rub [Edema] : there was no peripheral edema [Bowel Sounds] : normal bowel sounds [Abdomen Soft] : soft [Skin Lesions] : no skin lesions [No Focal Deficits] : no focal deficits [Oriented To Time, Place, And Person] : oriented to person, place, and time [Affect] : the affect was normal

## 2024-02-09 NOTE — ASSESSMENT
[FreeTextEntry1] : 72 yo F Presents today for elevated WBC and fevers.  No obvious infection found on lab work or imaging to date. CT scans w/o obvious infection Found to have CLL, uterine mass, and new DM.  WBC could be explained by CLL. Fevers could be explained by uterine mass, ? necrosis. Blood cultures were negative and urine negative on report by pt.   No role for antibiotics.  She can return if needed or if any ID issue arises. From ID perspective, can proceed with planned GYN procedure.

## 2024-02-09 NOTE — HISTORY OF PRESENT ILLNESS
[FreeTextEntry1] : 74 yo F Presents today for elevated WBC and fevers.  Has had high wbc for 1 year. Also notes low grade fevers. Does have large uterine mass which needs resection. 1/2024 had higher fevers. Went to ER. Found to have covid, Also saw heme and is anemic and found to have CLL on bone marrow bx.  Recently found to A1C 11.4 and down to 8.4 in the past year.   Blood cultures done 1/31 at [facility] - negative x 2.  Quant gold negative - scanned to chart after had indeterminant Fevers .4 Takes tylenol Fevers have been higher in the past.  WBC last 11 1/31/24  Urine was clean as per pt.  gallstones noted on ct cystic mass in pelvis lung scarring, nodules noted on CT chest.

## 2024-03-04 ENCOUNTER — APPOINTMENT (OUTPATIENT)
Dept: GYNECOLOGIC ONCOLOGY | Facility: CLINIC | Age: 74
End: 2024-03-04
Payer: MEDICARE

## 2024-03-04 VITALS
WEIGHT: 106.13 LBS | SYSTOLIC BLOOD PRESSURE: 119 MMHG | BODY MASS INDEX: 19.41 KG/M2 | HEART RATE: 92 BPM | DIASTOLIC BLOOD PRESSURE: 71 MMHG

## 2024-03-04 PROCEDURE — 99214 OFFICE O/P EST MOD 30 MIN: CPT

## 2024-03-04 NOTE — DISCUSSION/SUMMARY
[FreeTextEntry1] : -I met with the pt. We reviewed her prior exam and the imaging studies/findings. We again disc the DDx.  -Management options were reviewed, including my advice for exploration and excision; we disc the role of BSO and that of hysterectomy. The use of FS and the role of and nature of staging was yet again disc in detail, as was the indication for and nature of staging, as well as the poss need for adj therapy.  -Periop and recuperative issues were discussed, as were the possible hazards and risks of surgery. -We reviewed the prior diagram in detail, and she notes having looked at it with her family.  -I advised a clearance (her PCP was tasked). We have disc her DM status aas an issue for proceeding.   -We disc her Heme eval and await the PET ordered by DB.   -I tasked the FD to get the c-scope by CS, which the pt says was neg.   -I again disc my rationale for use of cysto/UCaths at the time of surgery, and she reports that Dr Lezama advised her to have the full time docs help. We disc the need for a  consult preop.   -Her instructions were reviewed. -All Q/A. -She will call  to schedule. Orders redone. S/W SM.  -Orders in.

## 2024-03-04 NOTE — PHYSICAL EXAM
[Absent] : CVAT: absent [Abnormal] : Bimanual Exam: Abnormal [Normal] : Recto-Vaginal Exam: Normal [de-identified] : lower abd mass to near umbilicus; no peritoneal signs [FreeTextEntry1] : Somewhat weak appearing [de-identified] : The uterus nonpalpable but the cx was mobile [de-identified] : atrophy [de-identified] : A mass was filling the pelvics to near the umbilicus [de-identified] : B/M: A mass was filling the pelvis to near the umbilicus

## 2024-03-04 NOTE — HISTORY OF PRESENT ILLNESS
[FreeTextEntry1] : Referring GYN - Dr. Julio White PCP - Dr. Adama Estevez Uro - Dr. Aaron Lezama  Pulm - Dr. Kristen Washington GI - none of late Heme/Onc: Dr Андрей Gregory Surg: Dr REAL Loya  Ms. Dey presents for follow up. She notes having recovered from COVID. DB ordered a PET; scheduled for 3/14. I asked for a copy of the report. CS did c-scope per pt, neg. Will req report. She wishes to proceed with surgery.   She is a 74 yo  postmenopausal female who presents for initial consultation at the request of Dr. White for the management of a pelvic mass. She was seen by GYN after urological evaluation for urinary issues on 9/19/23. CT urogram was ordered for abdominal pain/swelling which revealed a large pelvic mass with enhancing soft tissue components. She was referred here for further management.    IMAGING: Pelvic MRI on 11/9/23: UTERUS: Large complex cystic mass with solid enhancing components within the endometrium measuring 9.5 x 14.8 x 10.9 cm (AP, CC, TR), unchanged. A solid component along the left side of the mass measures 3.0 x 5.6 x 4.5 cm and a solid component along the anterior wall of the mass measures 3.8 x 4.1 x 2.1 cm. The cystic component of the mass demonstrates T1 and T2 hyperintensity compatible with the presence of either blood products or proteinaceous fluid. RIGHT OVARY: Within normal limits. LEFT OVARY: Within normal limits. ADNEXA: Within normal limits. BLADDER: Within normal limits. LYMPH NODES: No pelvic lymphadenopathy. VISUALIZED PORTIONS: ABDOMINAL ORGANS: Within normal limits. BOWEL: Within normal limits. PERITONEUM: No ascites. VESSELS: Within normal limits. ABDOMINAL WALL: Within normal limits. BONES: Within normal limits. IMPRESSION: 14.8 cm complex cystic endometrial mass compatible with uterine malignancy. No lymphadenopathy or other evidence of metastatic disease. She says Dr Lezama told her to have one of the staff 's help with the UCaths. She reports no VB or VD.   In office TVUS on 9/19/23: large 10 cm pelvic mass of unclear origin  CT Urogram on 8/24/23 (outside): Marked enlargement of the uterus 16.9 x 13 x 10.1 cm secondary to a large thick-walled cystic mass. Protruding into the fluid collection are 2 areas of irregular enhancing soft tissue masses 3.8 x 3.2 x 1.7 cm and 4.2 x 2.8 x 2.0 cm. The body of the uterus is deviated to the left. No LAD. No findings of nephrolithiasis.   PMHx: HTN, hypothyroidism, DM PSHx: TL, Appy, Repair of fracture Fam Hx: N/A  HCM: PAP on 9/2023 - NEGATIVE Colonoscopy: [id number]; C-scope ~10+ ya per pt COVID-19 vaccine series completed

## 2024-03-09 ENCOUNTER — APPOINTMENT (OUTPATIENT)
Dept: INTERNAL MEDICINE | Facility: CLINIC | Age: 74
End: 2024-03-09

## 2024-03-21 ENCOUNTER — APPOINTMENT (OUTPATIENT)
Dept: UROLOGY | Facility: CLINIC | Age: 74
End: 2024-03-21
Payer: MEDICARE

## 2024-03-21 DIAGNOSIS — Z63.4 DISAPPEARANCE AND DEATH OF FAMILY MEMBER: ICD-10-CM

## 2024-03-21 PROCEDURE — 99203 OFFICE O/P NEW LOW 30 MIN: CPT

## 2024-03-21 SDOH — SOCIAL STABILITY - SOCIAL INSECURITY: DISSAPEARANCE AND DEATH OF FAMILY MEMBER: Z63.4

## 2024-03-21 NOTE — HISTORY OF PRESENT ILLNESS
[FreeTextEntry1] : 74F presents for initial evaluation anticipating ureteral catheter placement  Scheduled for ex lap, MARTÍN/BSO 4/11/24 with Dr. Cassidy  Requesting cysto, ucath placement. Patient here for consultation to discuss  Patient denies  history CT (12/20/23): no hydro or upper tract pathology Cr (1/2023):  Cr: 0.7

## 2024-03-21 NOTE — ASSESSMENT
[FreeTextEntry1] :  Ms. Dey presents for consultation for ucath placement Scheduled for open MARTÍN/BSO on 4/11/23  RBA discussed at length including hematuria, UTI, pyelo. Reviewed ucaths help identify but not necessarily prevent injury. There is a risk of ureteral spasm, flank pain, RUBY with placement, potentially requiring ureteral stent placement.   I discussed I will not be putting in her ureteral catheters myself. I will direct the consultation to our scheduling team to ensure a Urologist is present.   Urine culture today.  Addendum No urine specimen obtained. Called patient to instruct to drop off specimen. PST on 3/24, patient may provide urine at that time as well.

## 2024-03-27 ENCOUNTER — APPOINTMENT (OUTPATIENT)
Dept: INTERNAL MEDICINE | Facility: CLINIC | Age: 74
End: 2024-03-27
Payer: MEDICARE

## 2024-03-27 ENCOUNTER — NON-APPOINTMENT (OUTPATIENT)
Age: 74
End: 2024-03-27

## 2024-03-27 VITALS
SYSTOLIC BLOOD PRESSURE: 123 MMHG | DIASTOLIC BLOOD PRESSURE: 65 MMHG | OXYGEN SATURATION: 98 % | BODY MASS INDEX: 19.32 KG/M2 | WEIGHT: 105 LBS | TEMPERATURE: 98.3 F | HEIGHT: 62 IN | HEART RATE: 90 BPM

## 2024-03-27 DIAGNOSIS — Z87.898 PERSONAL HISTORY OF OTHER SPECIFIED CONDITIONS: ICD-10-CM

## 2024-03-27 DIAGNOSIS — R51.9 HEADACHE, UNSPECIFIED: ICD-10-CM

## 2024-03-27 DIAGNOSIS — S09.8XXA OTHER SPECIFIED INJURIES OF HEAD, INITIAL ENCOUNTER: ICD-10-CM

## 2024-03-27 DIAGNOSIS — F07.81 POSTCONCUSSIONAL SYNDROME: ICD-10-CM

## 2024-03-27 DIAGNOSIS — M76.821 POSTERIOR TIBIAL TENDINITIS, RIGHT LEG: ICD-10-CM

## 2024-03-27 DIAGNOSIS — E11.9 TYPE 2 DIABETES MELLITUS W/OUT COMPLICATIONS: ICD-10-CM

## 2024-03-27 DIAGNOSIS — R19.00 INTRA-ABDOMINAL AND PELVIC SWELLING, MASS AND LUMP, UNSPECIFIED SITE: ICD-10-CM

## 2024-03-27 DIAGNOSIS — U07.1 COVID-19: ICD-10-CM

## 2024-03-27 PROCEDURE — 93000 ELECTROCARDIOGRAM COMPLETE: CPT

## 2024-03-27 PROCEDURE — 99214 OFFICE O/P EST MOD 30 MIN: CPT

## 2024-03-27 RX ORDER — CHROMIUM 200 MCG
TABLET ORAL
Refills: 0 | Status: ACTIVE | COMMUNITY

## 2024-03-27 RX ORDER — NIRMATRELVIR AND RITONAVIR 300-100 MG
20 X 150 MG & KIT ORAL
Qty: 1 | Refills: 0 | Status: DISCONTINUED | COMMUNITY
Start: 2024-01-28 | End: 2024-03-27

## 2024-03-27 RX ORDER — LEVOTHYROXINE SODIUM 0.1 MG/1
100 TABLET ORAL DAILY
Qty: 90 | Refills: 3 | Status: ACTIVE | COMMUNITY

## 2024-04-05 ENCOUNTER — OUTPATIENT (OUTPATIENT)
Dept: OUTPATIENT SERVICES | Facility: HOSPITAL | Age: 74
LOS: 1 days | End: 2024-04-05

## 2024-04-05 VITALS
DIASTOLIC BLOOD PRESSURE: 72 MMHG | WEIGHT: 102.07 LBS | HEART RATE: 88 BPM | SYSTOLIC BLOOD PRESSURE: 138 MMHG | TEMPERATURE: 97 F | OXYGEN SATURATION: 98 % | RESPIRATION RATE: 16 BRPM | HEIGHT: 62 IN

## 2024-04-05 DIAGNOSIS — R19.00 INTRA-ABDOMINAL AND PELVIC SWELLING, MASS AND LUMP, UNSPECIFIED SITE: ICD-10-CM

## 2024-04-05 DIAGNOSIS — Z98.890 OTHER SPECIFIED POSTPROCEDURAL STATES: Chronic | ICD-10-CM

## 2024-04-05 DIAGNOSIS — Z98.51 TUBAL LIGATION STATUS: Chronic | ICD-10-CM

## 2024-04-05 DIAGNOSIS — Z90.89 ACQUIRED ABSENCE OF OTHER ORGANS: Chronic | ICD-10-CM

## 2024-04-05 DIAGNOSIS — Z90.49 ACQUIRED ABSENCE OF OTHER SPECIFIED PARTS OF DIGESTIVE TRACT: Chronic | ICD-10-CM

## 2024-04-05 DIAGNOSIS — R00.2 PALPITATIONS: ICD-10-CM

## 2024-04-05 DIAGNOSIS — E11.9 TYPE 2 DIABETES MELLITUS WITHOUT COMPLICATIONS: ICD-10-CM

## 2024-04-05 DIAGNOSIS — E03.9 HYPOTHYROIDISM, UNSPECIFIED: ICD-10-CM

## 2024-04-05 LAB
A1C WITH ESTIMATED AVERAGE GLUCOSE RESULT: 7.6 % — HIGH (ref 4–5.6)
ANION GAP SERPL CALC-SCNC: 15 MMOL/L — HIGH (ref 7–14)
APPEARANCE UR: ABNORMAL
BACTERIA # UR AUTO: NEGATIVE /HPF — SIGNIFICANT CHANGE UP
BILIRUB UR-MCNC: NEGATIVE — SIGNIFICANT CHANGE UP
BLD GP AB SCN SERPL QL: NEGATIVE — SIGNIFICANT CHANGE UP
BUN SERPL-MCNC: 16 MG/DL — SIGNIFICANT CHANGE UP (ref 7–23)
CALCIUM SERPL-MCNC: 10 MG/DL — SIGNIFICANT CHANGE UP (ref 8.4–10.5)
CAST: 0 /LPF — SIGNIFICANT CHANGE UP (ref 0–4)
CHLORIDE SERPL-SCNC: 94 MMOL/L — LOW (ref 98–107)
CO2 SERPL-SCNC: 26 MMOL/L — SIGNIFICANT CHANGE UP (ref 22–31)
COLOR SPEC: YELLOW — SIGNIFICANT CHANGE UP
CREAT SERPL-MCNC: 0.56 MG/DL — SIGNIFICANT CHANGE UP (ref 0.5–1.3)
DIFF PNL FLD: NEGATIVE — SIGNIFICANT CHANGE UP
EGFR: 97 ML/MIN/1.73M2 — SIGNIFICANT CHANGE UP
ESTIMATED AVERAGE GLUCOSE: 171 — SIGNIFICANT CHANGE UP
GLUCOSE SERPL-MCNC: 274 MG/DL — HIGH (ref 70–99)
GLUCOSE UR QL: NEGATIVE MG/DL — SIGNIFICANT CHANGE UP
HCT VFR BLD CALC: 28.8 % — LOW (ref 34.5–45)
HGB BLD-MCNC: 8.2 G/DL — LOW (ref 11.5–15.5)
KETONES UR-MCNC: NEGATIVE MG/DL — SIGNIFICANT CHANGE UP
LEUKOCYTE ESTERASE UR-ACNC: NEGATIVE — SIGNIFICANT CHANGE UP
MCHC RBC-ENTMCNC: 22.8 PG — LOW (ref 27–34)
MCHC RBC-ENTMCNC: 28.5 GM/DL — LOW (ref 32–36)
MCV RBC AUTO: 80 FL — SIGNIFICANT CHANGE UP (ref 80–100)
NITRITE UR-MCNC: NEGATIVE — SIGNIFICANT CHANGE UP
NRBC # BLD: 0 /100 WBCS — SIGNIFICANT CHANGE UP (ref 0–0)
NRBC # FLD: 0 K/UL — SIGNIFICANT CHANGE UP (ref 0–0)
PH UR: 6.5 — SIGNIFICANT CHANGE UP (ref 5–8)
PLATELET # BLD AUTO: 617 K/UL — HIGH (ref 150–400)
POTASSIUM SERPL-MCNC: 4.7 MMOL/L — SIGNIFICANT CHANGE UP (ref 3.5–5.3)
POTASSIUM SERPL-SCNC: 4.7 MMOL/L — SIGNIFICANT CHANGE UP (ref 3.5–5.3)
PROT UR-MCNC: 30 MG/DL
RBC # BLD: 3.6 M/UL — LOW (ref 3.8–5.2)
RBC # FLD: 17.1 % — HIGH (ref 10.3–14.5)
RBC CASTS # UR COMP ASSIST: 1 /HPF — SIGNIFICANT CHANGE UP (ref 0–4)
RH IG SCN BLD-IMP: POSITIVE — SIGNIFICANT CHANGE UP
RH IG SCN BLD-IMP: POSITIVE — SIGNIFICANT CHANGE UP
SODIUM SERPL-SCNC: 135 MMOL/L — SIGNIFICANT CHANGE UP (ref 135–145)
SP GR SPEC: 1.02 — SIGNIFICANT CHANGE UP (ref 1–1.03)
SQUAMOUS # UR AUTO: 0 /HPF — SIGNIFICANT CHANGE UP (ref 0–5)
UROBILINOGEN FLD QL: 0.2 MG/DL — SIGNIFICANT CHANGE UP (ref 0.2–1)
WBC # BLD: 26.08 K/UL — HIGH (ref 3.8–10.5)
WBC # FLD AUTO: 26.08 K/UL — HIGH (ref 3.8–10.5)
WBC UR QL: 0 /HPF — SIGNIFICANT CHANGE UP (ref 0–5)

## 2024-04-05 RX ORDER — SODIUM CHLORIDE 9 MG/ML
1000 INJECTION, SOLUTION INTRAVENOUS
Refills: 0 | Status: DISCONTINUED | OUTPATIENT
Start: 2024-04-11 | End: 2024-04-11

## 2024-04-05 RX ORDER — DEXTROSE 50 % IN WATER 50 %
25 SYRINGE (ML) INTRAVENOUS ONCE
Refills: 0 | Status: DISCONTINUED | OUTPATIENT
Start: 2024-04-11 | End: 2024-04-15

## 2024-04-05 RX ORDER — SODIUM CHLORIDE 9 MG/ML
3 INJECTION INTRAMUSCULAR; INTRAVENOUS; SUBCUTANEOUS EVERY 8 HOURS
Refills: 0 | Status: DISCONTINUED | OUTPATIENT
Start: 2024-04-11 | End: 2024-04-15

## 2024-04-05 RX ORDER — DEXTROSE 50 % IN WATER 50 %
15 SYRINGE (ML) INTRAVENOUS ONCE
Refills: 0 | Status: DISCONTINUED | OUTPATIENT
Start: 2024-04-11 | End: 2024-04-15

## 2024-04-05 RX ORDER — CHLORHEXIDINE GLUCONATE 213 G/1000ML
1 SOLUTION TOPICAL DAILY
Refills: 0 | Status: DISCONTINUED | OUTPATIENT
Start: 2024-04-11 | End: 2024-04-12

## 2024-04-05 RX ORDER — DEXTROSE 50 % IN WATER 50 %
12.5 SYRINGE (ML) INTRAVENOUS ONCE
Refills: 0 | Status: DISCONTINUED | OUTPATIENT
Start: 2024-04-11 | End: 2024-04-15

## 2024-04-05 RX ORDER — GLUCAGON INJECTION, SOLUTION 0.5 MG/.1ML
1 INJECTION, SOLUTION SUBCUTANEOUS ONCE
Refills: 0 | Status: DISCONTINUED | OUTPATIENT
Start: 2024-04-11 | End: 2024-04-15

## 2024-04-05 NOTE — H&P PST ADULT - CARDIOVASCULAR
details… regular rate and rhythm/S1 S2 present/no gallops/no rub regular rate and rhythm/S1 S2 present/no gallops/no rub/no pedal edema

## 2024-04-05 NOTE — H&P PST ADULT - NSICDXPASTSURGICALHX_GEN_ALL_CORE_FT
PAST SURGICAL HISTORY:  H/O coronary angiogram     History of ankle surgery     History of bronchoscopy     S/P appendectomy     S/P tonsillectomy     S/P tubal ligation

## 2024-04-05 NOTE — H&P PST ADULT - ATTENDING COMMENTS
Seen in ASU with her sister. She reports no changes. Med/Heme notes reviewed. Planned procedure reviewed, incl EUA by learner, Ex Lap (VI), MARTÍN, BSO, poss staging, cysto/stents, other indicated procedures. Consent for reviewed. All Q/A. Case d/w Anesth attd and OR Circ RN.

## 2024-04-05 NOTE — PHYSICAL EXAM
[No Acute Distress] : no acute distress [Normal Sclera/Conjunctiva] : normal sclera/conjunctiva [EOMI] : extraocular movements intact [Normal TMs] : both tympanic membranes were normal [No Lymphadenopathy] : no lymphadenopathy [Supple] : supple [Normal Rate] : normal rate  [Regular Rhythm] : with a regular rhythm [Normal S1, S2] : normal S1 and S2 [No Carotid Bruits] : no carotid bruits [No Varicosities] : no varicosities [Pedal Pulses Present] : the pedal pulses are present [No Edema] : there was no peripheral edema [Soft] : abdomen soft [No Extremity Clubbing/Cyanosis] : no extremity clubbing/cyanosis [Normal] : no CVA or spinal tenderness [Grossly Normal Strength/Tone] : grossly normal strength/tone [No Rash] : no rash [No Focal Deficits] : no focal deficits [Speech Grossly Normal] : speech grossly normal [Normal Insight/Judgement] : insight and judgment were intact [de-identified] : thin and frail appearing [de-identified] : Mallampati Class I [de-identified] : 2/6 harsh late systolic murmur heard along left sternal border [de-identified] : distended, with large inferior mass up to umbilicus; mildly tender diffusely [de-identified] : no inguinal LN [de-identified] : flat affect

## 2024-04-05 NOTE — HISTORY OF PRESENT ILLNESS
[No Pertinent Cardiac History] : no history of aortic stenosis, atrial fibrillation, coronary artery disease, recent myocardial infarction, or implantable device/pacemaker [No Pertinent Pulmonary History] : no history of asthma, COPD, sleep apnea, or smoking [No Adverse Anesthesia Reaction] : no adverse anesthesia reaction in self or family member [Diabetes] : diabetes [(Patient denies any chest pain, claudication, dyspnea on exertion, orthopnea, palpitations or syncope)] : Patient denies any chest pain, claudication, dyspnea on exertion, orthopnea, palpitations or syncope [Poor (<4 METs)] : Poor (<4 METs) [Chronic Anticoagulation] : no chronic anticoagulation [Chronic Kidney Disease] : no chronic kidney disease [FreeTextEntry1] : IMELDA RESENDIZO [FreeTextEntry2] : 04/11/2024 [FreeTextEntry3] : Khanh [FreeTextEntry4] : 75 yo F, here for preop evaluation. Previous surgeries of appendectomy, BTL, with GA, reports low BP with Propofol for bronch 10 years ago, no family h/o GA issues. No personal h/o bleeding or VTE. Seeing heme for anemia, sees endocrine (outside [facility]) for DM, recent addition of short-acting insulin for A1c per pt last month of 8.4%. Pt wears Dexcom.  [FreeTextEntry6] : previous palps, better on BB [FreeTextEntry8] : SOB with FOB, very deconditioned, no recent activity due to fatigue

## 2024-04-05 NOTE — H&P PST ADULT - PROBLEM SELECTOR PLAN 1
Patient scheduled for surgery on: 4/11/24  Provided with verbal and written presurgical instructions  Verbalized understanding  with teach back on the following: Famotidine for GI prophylaxis and chlorhexidine wash    Lab specimen drawn at PST today: cbc, bmp, hga1c, type abo    Medical  evaluation in chart

## 2024-04-05 NOTE — H&P PST ADULT - NSICDXFAMILYHX_GEN_ALL_CORE_FT
FAMILY HISTORY:  Father  Still living? No  FH: heart attack, Age at diagnosis: Age Unknown    Mother  Still living? No  FH: heart attack, Age at diagnosis: Age Unknown    Sibling  Still living? Yes, Estimated age: Age Unknown  FH: heart disease, Age at diagnosis: Age Unknown  FH: heart disease, Age at diagnosis: Age Unknown  FH: heart disease, Age at diagnosis: Age Unknown

## 2024-04-05 NOTE — H&P PST ADULT - NSICDXPASTMEDICALHX_GEN_ALL_CORE_FT
PAST MEDICAL HISTORY:  DM (diabetes mellitus)     H/O fracture of ankle     History of depression     History of unexplained fever     Hypothyroid     Intra-abdominal and pelvic swelling, mass and lump, unspecified site     Leiomyoma     Palpitations     Stress incontinence

## 2024-04-05 NOTE — H&P PST ADULT - HISTORY OF PRESENT ILLNESS
74 yr old female with PMH DM, HTN , presents with 30 pound unintentional weight loss over past year, and new onset stress incontinence in the setting of fibroids, reports generalized weakness and fatigue, denies vaginal bleeding or discharge, scheduled for ex-lap, MARTÍN, BSO, possible staging, cystoscopy

## 2024-04-05 NOTE — REVIEW OF SYSTEMS
[Cough] : cough [Incontinence] : incontinence [Negative] : Gastrointestinal [FreeTextEntry6] : recent cough, improving with OTC cough meds

## 2024-04-05 NOTE — ADDENDUM
[FreeTextEntry1] : Labs from today: CBC with elevated WBC (26), higher than previous, and stable H/H = 8.2/28.8. UA WNL, no evidence of infection.  Pt may proceed with surgery. Elevated WBC chronic and more likely due to underlying process than to infection.

## 2024-04-05 NOTE — ASSESSMENT
[High Risk Surgery - Intraperitoneal, Intrathoracic or Supringuinal Vascular Procedures] : High Risk Surgery - Intraperitoneal, Intrathoracic or Supringuinal Vascular Procedures - Yes (1) [Ischemic Heart Disease] : Ischemic Heart Disease - No (0) [Congestive Heart Failure] : Congestive Heart Failure - No (0) [Prior Cerebrovascular Accident or TIA] : Prior Cerebrovascular Accident or TIA - No (0) [Insulin-dependent Diabetic (1 point)] : Insulin-dependent Diabetic - Yes (1) [Creatinine >= 2mg/dL (1 Point)] : Creatinine >= 2mg/dL - No (0) [2] : 2 , RCRI Class: III, Risk of Post-Op Cardiac Complications: 10.1%, 95% CI for Risk Estimate: 8.1% - 12.6% [Patient Optimized for Surgery] : Patient optimized for surgery [No Further Testing Recommended] : no further testing recommended [Modify medications prior to procedure] : Modify medications prior to procedure [As per surgery] : as per surgery [FreeTextEntry4] : Pt is moderate risk for moderate risk procedure. In addition to her poorly controlled DM, she has frailty and anemia (which is mild and chronic). I reviewed with pt meds to take during surgery - only metoprolol and Lantus bedtime prior. No other meds on morning of procedure.  Med and problem list updated.  Pt to go to pre-op testing for labs in next 2 weeks. Will review these when available.  [FreeTextEntry5] : N/A [FreeTextEntry6] : N/A [FreeTextEntry7] : see above

## 2024-04-05 NOTE — RESULTS/DATA
[] : results reviewed [de-identified] : nSR, low voltage, PRWP, compared with prior EKG of 2018, no significant change

## 2024-04-05 NOTE — H&P PST ADULT - PROBLEM SELECTOR PLAN 4
Patient instructed on the following medications adjustments:   Hold metformin p.m. dose on 4/10/24 and on a.m. dose on 4/11/24    Reduce lantus to 9 units on 4/10/24   Hold Januvia and admelog on DOS        POCT glucose testing ordered STAT upon admission and Hypoglycemia protocol for history of insulin use Patient instructed on the following medications adjustments:   Hold metformin p.m. dose on 4/10/24 and on a.m. dose on 4/11/24    Reduce lantus to 6 units on 4/10/24   Hold Januvia and admelog on DOS        POCT glucose testing ordered STAT upon admission and Hypoglycemia protocol for history of insulin use

## 2024-04-05 NOTE — H&P PST ADULT - ENDOCRINE COMMENTS
DM- uses Dexcom preprandial less than 200; postprandial **Reports dizziness when BG is less than 160

## 2024-04-06 LAB
CULTURE RESULTS: SIGNIFICANT CHANGE UP
SPECIMEN SOURCE: SIGNIFICANT CHANGE UP

## 2024-04-10 ENCOUNTER — TRANSCRIPTION ENCOUNTER (OUTPATIENT)
Age: 74
End: 2024-04-10

## 2024-04-10 NOTE — ASU PATIENT PROFILE, ADULT - FALL HARM RISK - HARM RISK INTERVENTIONS
Assistance with ambulation/Assistance OOB with selected safe patient handling equipment/Communicate Risk of Fall with Harm to all staff/Discuss with provider need for PT consult/Monitor gait and stability/Reinforce activity limits and safety measures with patient and family/Tailored Fall Risk Interventions/Visual Cue: Yellow wristband and red socks/Bed in lowest position, wheels locked, appropriate side rails in place/Call bell, personal items and telephone in reach/Instruct patient to call for assistance before getting out of bed or chair/Non-slip footwear when patient is out of bed/[place] to call system/Physically safe environment - no spills, clutter or unnecessary equipment/Purposeful Proactive Rounding/Room/bathroom lighting operational, light cord in reach

## 2024-04-11 ENCOUNTER — INPATIENT (INPATIENT)
Facility: HOSPITAL | Age: 74
LOS: 3 days | Discharge: HOME | End: 2024-04-15
Attending: OBSTETRICS & GYNECOLOGY | Admitting: OBSTETRICS & GYNECOLOGY
Payer: MEDICARE

## 2024-04-11 ENCOUNTER — APPOINTMENT (OUTPATIENT)
Dept: GYNECOLOGIC ONCOLOGY | Facility: HOSPITAL | Age: 74
End: 2024-04-11

## 2024-04-11 ENCOUNTER — RESULT REVIEW (OUTPATIENT)
Age: 74
End: 2024-04-11

## 2024-04-11 VITALS
SYSTOLIC BLOOD PRESSURE: 124 MMHG | WEIGHT: 102.07 LBS | DIASTOLIC BLOOD PRESSURE: 58 MMHG | TEMPERATURE: 97 F | OXYGEN SATURATION: 100 % | HEART RATE: 82 BPM | HEIGHT: 62 IN | RESPIRATION RATE: 16 BRPM

## 2024-04-11 DIAGNOSIS — Z90.89 ACQUIRED ABSENCE OF OTHER ORGANS: Chronic | ICD-10-CM

## 2024-04-11 DIAGNOSIS — R19.00 INTRA-ABDOMINAL AND PELVIC SWELLING, MASS AND LUMP, UNSPECIFIED SITE: ICD-10-CM

## 2024-04-11 DIAGNOSIS — Z98.890 OTHER SPECIFIED POSTPROCEDURAL STATES: Chronic | ICD-10-CM

## 2024-04-11 DIAGNOSIS — Z90.49 ACQUIRED ABSENCE OF OTHER SPECIFIED PARTS OF DIGESTIVE TRACT: Chronic | ICD-10-CM

## 2024-04-11 DIAGNOSIS — E11.9 TYPE 2 DIABETES MELLITUS WITHOUT COMPLICATIONS: ICD-10-CM

## 2024-04-11 DIAGNOSIS — Z98.51 TUBAL LIGATION STATUS: Chronic | ICD-10-CM

## 2024-04-11 LAB
ALBUMIN SERPL ELPH-MCNC: 2.5 G/DL — LOW (ref 3.3–5)
ALP SERPL-CCNC: 166 U/L — HIGH (ref 40–120)
ALT FLD-CCNC: 10 U/L — SIGNIFICANT CHANGE UP (ref 4–33)
ANION GAP SERPL CALC-SCNC: 12 MMOL/L — SIGNIFICANT CHANGE UP (ref 7–14)
ANISOCYTOSIS BLD QL: SLIGHT — SIGNIFICANT CHANGE UP
AST SERPL-CCNC: 14 U/L — SIGNIFICANT CHANGE UP (ref 4–32)
BASE EXCESS BLDV CALC-SCNC: -0.9 MMOL/L — SIGNIFICANT CHANGE UP (ref -2–3)
BASE EXCESS BLDV CALC-SCNC: -5.1 MMOL/L — LOW (ref -2–3)
BASE EXCESS BLDV CALC-SCNC: 0.4 MMOL/L — SIGNIFICANT CHANGE UP (ref -2–3)
BASE EXCESS BLDV CALC-SCNC: 3.9 MMOL/L — HIGH (ref -2–3)
BASOPHILS # BLD AUTO: 0.23 K/UL — HIGH (ref 0–0.2)
BASOPHILS NFR BLD AUTO: 0.9 % — SIGNIFICANT CHANGE UP (ref 0–2)
BILIRUB SERPL-MCNC: 0.3 MG/DL — SIGNIFICANT CHANGE UP (ref 0.2–1.2)
BUN SERPL-MCNC: 16 MG/DL — SIGNIFICANT CHANGE UP (ref 7–23)
CA-I BLD-SCNC: 1.14 MMOL/L — LOW (ref 1.15–1.29)
CA-I SERPL-SCNC: 1.09 MMOL/L — LOW (ref 1.15–1.33)
CA-I SERPL-SCNC: 1.24 MMOL/L — SIGNIFICANT CHANGE UP (ref 1.15–1.33)
CA-I SERPL-SCNC: 1.25 MMOL/L — SIGNIFICANT CHANGE UP (ref 1.15–1.33)
CA-I SERPL-SCNC: 1.26 MMOL/L — SIGNIFICANT CHANGE UP (ref 1.15–1.33)
CALCIUM SERPL-MCNC: 8.9 MG/DL — SIGNIFICANT CHANGE UP (ref 8.4–10.5)
CANCER AG125 SERPL-ACNC: 238 U/ML — HIGH
CHLORIDE BLDV-SCNC: SIGNIFICANT CHANGE UP MMOL/L (ref 96–108)
CHLORIDE SERPL-SCNC: 101 MMOL/L — SIGNIFICANT CHANGE UP (ref 98–107)
CO2 BLDV-SCNC: 20.7 MMOL/L — LOW (ref 22–26)
CO2 BLDV-SCNC: 24.1 MMOL/L — SIGNIFICANT CHANGE UP (ref 22–26)
CO2 BLDV-SCNC: 25.6 MMOL/L — SIGNIFICANT CHANGE UP (ref 22–26)
CO2 BLDV-SCNC: 28.7 MMOL/L — HIGH (ref 22–26)
CO2 SERPL-SCNC: 22 MMOL/L — SIGNIFICANT CHANGE UP (ref 22–31)
CREAT SERPL-MCNC: 0.54 MG/DL — SIGNIFICANT CHANGE UP (ref 0.5–1.3)
EGFR: 98 ML/MIN/1.73M2 — SIGNIFICANT CHANGE UP
EOSINOPHIL # BLD AUTO: 0.2 K/UL — SIGNIFICANT CHANGE UP (ref 0–0.5)
EOSINOPHIL NFR BLD AUTO: 0.8 % — SIGNIFICANT CHANGE UP (ref 0–6)
GAS PNL BLDV: 131 MMOL/L — LOW (ref 136–145)
GAS PNL BLDV: 132 MMOL/L — LOW (ref 136–145)
GAS PNL BLDV: 133 MMOL/L — LOW (ref 136–145)
GAS PNL BLDV: 133 MMOL/L — LOW (ref 136–145)
GAS PNL BLDV: SIGNIFICANT CHANGE UP
GLUCOSE BLDC GLUCOMTR-MCNC: 195 MG/DL — HIGH (ref 70–99)
GLUCOSE BLDC GLUCOMTR-MCNC: 220 MG/DL — HIGH (ref 70–99)
GLUCOSE BLDC GLUCOMTR-MCNC: 239 MG/DL — HIGH (ref 70–99)
GLUCOSE BLDC GLUCOMTR-MCNC: 286 MG/DL — HIGH (ref 70–99)
GLUCOSE BLDC GLUCOMTR-MCNC: 289 MG/DL — HIGH (ref 70–99)
GLUCOSE BLDC GLUCOMTR-MCNC: 311 MG/DL — HIGH (ref 70–99)
GLUCOSE BLDV-MCNC: 203 MG/DL — HIGH (ref 70–99)
GLUCOSE BLDV-MCNC: 219 MG/DL — HIGH (ref 70–99)
GLUCOSE BLDV-MCNC: 244 MG/DL — HIGH (ref 70–99)
GLUCOSE BLDV-MCNC: 262 MG/DL — HIGH (ref 70–99)
GLUCOSE SERPL-MCNC: 225 MG/DL — HIGH (ref 70–99)
HCO3 BLDV-SCNC: 20 MMOL/L — LOW (ref 22–29)
HCO3 BLDV-SCNC: 23 MMOL/L — SIGNIFICANT CHANGE UP (ref 22–29)
HCO3 BLDV-SCNC: 24 MMOL/L — SIGNIFICANT CHANGE UP (ref 22–29)
HCO3 BLDV-SCNC: 28 MMOL/L — SIGNIFICANT CHANGE UP (ref 22–29)
HCT VFR BLD CALC: 26.8 % — LOW (ref 34.5–45)
HCT VFR BLDA CALC: 18 % — CRITICAL LOW (ref 34.5–46.5)
HCT VFR BLDA CALC: 20 % — CRITICAL LOW (ref 34.5–46.5)
HCT VFR BLDA CALC: 21 % — CRITICAL LOW (ref 34.5–46.5)
HCT VFR BLDA CALC: 24 % — LOW (ref 34.5–46.5)
HGB BLD CALC-MCNC: 5.9 G/DL — CRITICAL LOW (ref 11.7–16.1)
HGB BLD CALC-MCNC: 6.8 G/DL — CRITICAL LOW (ref 11.7–16.1)
HGB BLD CALC-MCNC: 6.9 G/DL — CRITICAL LOW (ref 11.7–16.1)
HGB BLD CALC-MCNC: 8.1 G/DL — LOW (ref 11.7–16.1)
HGB BLD-MCNC: 8 G/DL — LOW (ref 11.5–15.5)
HYPOCHROMIA BLD QL: SLIGHT — SIGNIFICANT CHANGE UP
IANC: 22.9 K/UL — HIGH (ref 1.8–7.4)
LACTATE BLDV-MCNC: 1 MMOL/L — SIGNIFICANT CHANGE UP (ref 0.5–2)
LACTATE BLDV-MCNC: 3.3 MMOL/L — HIGH (ref 0.5–2)
LACTATE BLDV-MCNC: 4 MMOL/L — CRITICAL HIGH (ref 0.5–2)
LACTATE BLDV-MCNC: 4.4 MMOL/L — CRITICAL HIGH (ref 0.5–2)
LYMPHOCYTES # BLD AUTO: 1.1 K/UL — SIGNIFICANT CHANGE UP (ref 1–3.3)
LYMPHOCYTES # BLD AUTO: 4.3 % — LOW (ref 13–44)
MAGNESIUM SERPL-MCNC: 1.8 MG/DL — SIGNIFICANT CHANGE UP (ref 1.6–2.6)
MCHC RBC-ENTMCNC: 23.4 PG — LOW (ref 27–34)
MCHC RBC-ENTMCNC: 29.9 GM/DL — LOW (ref 32–36)
MCV RBC AUTO: 78.4 FL — LOW (ref 80–100)
MICROCYTES BLD QL: SLIGHT — SIGNIFICANT CHANGE UP
MONOCYTES # BLD AUTO: 0.66 K/UL — SIGNIFICANT CHANGE UP (ref 0–0.9)
MONOCYTES NFR BLD AUTO: 2.6 % — SIGNIFICANT CHANGE UP (ref 2–14)
NEUTROPHILS # BLD AUTO: 23.37 K/UL — HIGH (ref 1.8–7.4)
NEUTROPHILS NFR BLD AUTO: 91.4 % — HIGH (ref 43–77)
PCO2 BLDV: 34 MMHG — LOW (ref 39–52)
PCO2 BLDV: 34 MMHG — LOW (ref 39–52)
PCO2 BLDV: 36 MMHG — LOW (ref 39–52)
PCO2 BLDV: 37 MMHG — LOW (ref 39–52)
PH BLDV: 7.37 — SIGNIFICANT CHANGE UP (ref 7.32–7.43)
PH BLDV: 7.44 — HIGH (ref 7.32–7.43)
PH BLDV: 7.44 — HIGH (ref 7.32–7.43)
PH BLDV: 7.48 — HIGH (ref 7.32–7.43)
PHOSPHATE SERPL-MCNC: 3.6 MG/DL — SIGNIFICANT CHANGE UP (ref 2.5–4.5)
PLAT MORPH BLD: NORMAL — SIGNIFICANT CHANGE UP
PLATELET # BLD AUTO: 585 K/UL — HIGH (ref 150–400)
PLATELET COUNT - ESTIMATE: ABNORMAL
PO2 BLDV: 104 MMHG — HIGH (ref 25–45)
PO2 BLDV: 57 MMHG — HIGH (ref 25–45)
PO2 BLDV: 68 MMHG — HIGH (ref 25–45)
PO2 BLDV: 82 MMHG — HIGH (ref 25–45)
POLYCHROMASIA BLD QL SMEAR: SLIGHT — SIGNIFICANT CHANGE UP
POTASSIUM BLDV-SCNC: 3.5 MMOL/L — SIGNIFICANT CHANGE UP (ref 3.5–5.1)
POTASSIUM BLDV-SCNC: 4.1 MMOL/L — SIGNIFICANT CHANGE UP (ref 3.5–5.1)
POTASSIUM BLDV-SCNC: 4.1 MMOL/L — SIGNIFICANT CHANGE UP (ref 3.5–5.1)
POTASSIUM BLDV-SCNC: 4.3 MMOL/L — SIGNIFICANT CHANGE UP (ref 3.5–5.1)
POTASSIUM SERPL-MCNC: 4.2 MMOL/L — SIGNIFICANT CHANGE UP (ref 3.5–5.3)
POTASSIUM SERPL-SCNC: 4.2 MMOL/L — SIGNIFICANT CHANGE UP (ref 3.5–5.3)
PROT SERPL-MCNC: 7.1 G/DL — SIGNIFICANT CHANGE UP (ref 6–8.3)
RBC # BLD: 3.42 M/UL — LOW (ref 3.8–5.2)
RBC # FLD: 16.9 % — HIGH (ref 10.3–14.5)
RBC BLD AUTO: ABNORMAL
SAO2 % BLDV: 85.2 % — SIGNIFICANT CHANGE UP (ref 67–88)
SAO2 % BLDV: 95.7 % — HIGH (ref 67–88)
SAO2 % BLDV: 97.2 % — HIGH (ref 67–88)
SAO2 % BLDV: 97.9 % — HIGH (ref 67–88)
SODIUM SERPL-SCNC: 135 MMOL/L — SIGNIFICANT CHANGE UP (ref 135–145)
WBC # BLD: 25.57 K/UL — HIGH (ref 3.8–10.5)
WBC # FLD AUTO: 25.57 K/UL — HIGH (ref 3.8–10.5)

## 2024-04-11 PROCEDURE — 88309 TISSUE EXAM BY PATHOLOGIST: CPT | Mod: 26

## 2024-04-11 PROCEDURE — 88112 CYTOPATH CELL ENHANCE TECH: CPT | Mod: 26

## 2024-04-11 PROCEDURE — 88305 TISSUE EXAM BY PATHOLOGIST: CPT | Mod: 26

## 2024-04-11 PROCEDURE — 88305 TISSUE EXAM BY PATHOLOGIST: CPT | Mod: 26,59

## 2024-04-11 PROCEDURE — 88331 PATH CONSLTJ SURG 1 BLK 1SPC: CPT | Mod: 26

## 2024-04-11 PROCEDURE — 88311 DECALCIFY TISSUE: CPT | Mod: 26

## 2024-04-11 PROCEDURE — 49203: CPT | Mod: 59

## 2024-04-11 PROCEDURE — 88341 IMHCHEM/IMCYTCHM EA ADD ANTB: CPT | Mod: 26,59

## 2024-04-11 PROCEDURE — 52005 CYSTO W/URTRL CATHJ: CPT

## 2024-04-11 PROCEDURE — 58200 EXTENSIVE HYSTERECTOMY: CPT

## 2024-04-11 PROCEDURE — 99223 1ST HOSP IP/OBS HIGH 75: CPT

## 2024-04-11 PROCEDURE — 88307 TISSUE EXAM BY PATHOLOGIST: CPT | Mod: 26

## 2024-04-11 PROCEDURE — 88342 IMHCHEM/IMCYTCHM 1ST ANTB: CPT | Mod: 26,59

## 2024-04-11 DEVICE — LIGATING CLIPS WECK HORIZON MEDIUM (BLUE) 24: Type: IMPLANTABLE DEVICE | Status: FUNCTIONAL

## 2024-04-11 DEVICE — SURGICEL FIBRILLAR 2 X 4": Type: IMPLANTABLE DEVICE | Status: FUNCTIONAL

## 2024-04-11 DEVICE — GUIDEWIRE SENSOR DUAL-FLEX NITINOL STRAIGHT .038" X 150CM: Type: IMPLANTABLE DEVICE | Status: FUNCTIONAL

## 2024-04-11 DEVICE — VISTASEAL FIBRIN HUMAN 10ML: Type: IMPLANTABLE DEVICE | Status: FUNCTIONAL

## 2024-04-11 DEVICE — LIGATING CLIPS WECK HORIZON LARGE (ORANGE) 24: Type: IMPLANTABLE DEVICE | Status: FUNCTIONAL

## 2024-04-11 DEVICE — URETERAL CATH OPEN END 5FR 70CM: Type: IMPLANTABLE DEVICE | Status: FUNCTIONAL

## 2024-04-11 DEVICE — ARISTA 3GR: Type: IMPLANTABLE DEVICE | Status: FUNCTIONAL

## 2024-04-11 DEVICE — SEPRAFILM 5 X 6": Type: IMPLANTABLE DEVICE | Status: FUNCTIONAL

## 2024-04-11 RX ORDER — DEXTROSE 50 % IN WATER 50 %
12.5 SYRINGE (ML) INTRAVENOUS ONCE
Refills: 0 | Status: DISCONTINUED | OUTPATIENT
Start: 2024-04-11 | End: 2024-04-15

## 2024-04-11 RX ORDER — SODIUM CHLORIDE 9 MG/ML
1000 INJECTION INTRAMUSCULAR; INTRAVENOUS; SUBCUTANEOUS
Refills: 0 | Status: DISCONTINUED | OUTPATIENT
Start: 2024-04-11 | End: 2024-04-13

## 2024-04-11 RX ORDER — ACETAMINOPHEN 500 MG
700 TABLET ORAL ONCE
Refills: 0 | Status: COMPLETED | OUTPATIENT
Start: 2024-04-11 | End: 2024-04-11

## 2024-04-11 RX ORDER — INSULIN LISPRO 100/ML
3 VIAL (ML) SUBCUTANEOUS
Refills: 0 | DISCHARGE

## 2024-04-11 RX ORDER — LEVOTHYROXINE SODIUM 125 MCG
1 TABLET ORAL
Qty: 0 | Refills: 0 | DISCHARGE

## 2024-04-11 RX ORDER — MAGNESIUM SULFATE 500 MG/ML
2 VIAL (ML) INJECTION ONCE
Refills: 0 | Status: COMPLETED | OUTPATIENT
Start: 2024-04-11 | End: 2024-04-11

## 2024-04-11 RX ORDER — ACETAMINOPHEN 500 MG
700 TABLET ORAL ONCE
Refills: 0 | Status: COMPLETED | OUTPATIENT
Start: 2024-04-12 | End: 2024-04-12

## 2024-04-11 RX ORDER — KETOROLAC TROMETHAMINE 30 MG/ML
30 SYRINGE (ML) INJECTION EVERY 8 HOURS
Refills: 0 | Status: DISCONTINUED | OUTPATIENT
Start: 2024-04-11 | End: 2024-04-12

## 2024-04-11 RX ORDER — SODIUM CHLORIDE 9 MG/ML
1000 INJECTION, SOLUTION INTRAVENOUS
Refills: 0 | Status: DISCONTINUED | OUTPATIENT
Start: 2024-04-11 | End: 2024-04-15

## 2024-04-11 RX ORDER — ENOXAPARIN SODIUM 100 MG/ML
40 INJECTION SUBCUTANEOUS EVERY 24 HOURS
Refills: 0 | Status: DISCONTINUED | OUTPATIENT
Start: 2024-04-11 | End: 2024-04-15

## 2024-04-11 RX ORDER — INSULIN GLARGINE 100 [IU]/ML
6 INJECTION, SOLUTION SUBCUTANEOUS AT BEDTIME
Refills: 0 | Status: DISCONTINUED | OUTPATIENT
Start: 2024-04-11 | End: 2024-04-12

## 2024-04-11 RX ORDER — SITAGLIPTIN 50 MG/1
1 TABLET, FILM COATED ORAL
Refills: 0 | DISCHARGE

## 2024-04-11 RX ORDER — METFORMIN HYDROCHLORIDE 850 MG/1
1 TABLET ORAL
Qty: 0 | Refills: 0 | DISCHARGE

## 2024-04-11 RX ORDER — DEXTROSE 50 % IN WATER 50 %
25 SYRINGE (ML) INTRAVENOUS ONCE
Refills: 0 | Status: DISCONTINUED | OUTPATIENT
Start: 2024-04-11 | End: 2024-04-15

## 2024-04-11 RX ORDER — ONDANSETRON 8 MG/1
4 TABLET, FILM COATED ORAL ONCE
Refills: 0 | Status: DISCONTINUED | OUTPATIENT
Start: 2024-04-11 | End: 2024-04-11

## 2024-04-11 RX ORDER — GLUCAGON INJECTION, SOLUTION 0.5 MG/.1ML
1 INJECTION, SOLUTION SUBCUTANEOUS ONCE
Refills: 0 | Status: DISCONTINUED | OUTPATIENT
Start: 2024-04-11 | End: 2024-04-15

## 2024-04-11 RX ORDER — INSULIN LISPRO 100/ML
VIAL (ML) SUBCUTANEOUS
Refills: 0 | Status: DISCONTINUED | OUTPATIENT
Start: 2024-04-11 | End: 2024-04-15

## 2024-04-11 RX ORDER — OXYCODONE HYDROCHLORIDE 5 MG/1
5 TABLET ORAL
Refills: 0 | Status: DISCONTINUED | OUTPATIENT
Start: 2024-04-11 | End: 2024-04-15

## 2024-04-11 RX ORDER — DEXTROSE 50 % IN WATER 50 %
15 SYRINGE (ML) INTRAVENOUS ONCE
Refills: 0 | Status: DISCONTINUED | OUTPATIENT
Start: 2024-04-11 | End: 2024-04-15

## 2024-04-11 RX ORDER — HYDROMORPHONE HYDROCHLORIDE 2 MG/ML
0.5 INJECTION INTRAMUSCULAR; INTRAVENOUS; SUBCUTANEOUS
Refills: 0 | Status: DISCONTINUED | OUTPATIENT
Start: 2024-04-11 | End: 2024-04-11

## 2024-04-11 RX ORDER — LEVOTHYROXINE SODIUM 125 MCG
100 TABLET ORAL DAILY
Refills: 0 | Status: DISCONTINUED | OUTPATIENT
Start: 2024-04-11 | End: 2024-04-15

## 2024-04-11 RX ORDER — METOPROLOL TARTRATE 50 MG
5 TABLET ORAL EVERY 6 HOURS
Refills: 0 | Status: DISCONTINUED | OUTPATIENT
Start: 2024-04-11 | End: 2024-04-15

## 2024-04-11 RX ORDER — METOPROLOL TARTRATE 50 MG
1 TABLET ORAL
Qty: 0 | Refills: 0 | DISCHARGE

## 2024-04-11 RX ORDER — DEXTROSE 10 % IN WATER 10 %
125 INTRAVENOUS SOLUTION INTRAVENOUS ONCE
Refills: 0 | Status: DISCONTINUED | OUTPATIENT
Start: 2024-04-11 | End: 2024-04-15

## 2024-04-11 RX ORDER — ONDANSETRON 8 MG/1
8 TABLET, FILM COATED ORAL EVERY 8 HOURS
Refills: 0 | Status: DISCONTINUED | OUTPATIENT
Start: 2024-04-11 | End: 2024-04-15

## 2024-04-11 RX ORDER — INSULIN GLARGINE 100 [IU]/ML
12 INJECTION, SOLUTION SUBCUTANEOUS AT BEDTIME
Refills: 0 | Status: DISCONTINUED | OUTPATIENT
Start: 2024-04-11 | End: 2024-04-11

## 2024-04-11 RX ORDER — SERTRALINE 25 MG/1
1 TABLET, FILM COATED ORAL
Qty: 0 | Refills: 0 | DISCHARGE

## 2024-04-11 RX ORDER — INSULIN LISPRO 100/ML
VIAL (ML) SUBCUTANEOUS AT BEDTIME
Refills: 0 | Status: DISCONTINUED | OUTPATIENT
Start: 2024-04-11 | End: 2024-04-15

## 2024-04-11 RX ORDER — INSULIN GLARGINE 100 [IU]/ML
12 INJECTION, SOLUTION SUBCUTANEOUS
Refills: 0 | DISCHARGE

## 2024-04-11 RX ORDER — SERTRALINE 25 MG/1
100 TABLET, FILM COATED ORAL DAILY
Refills: 0 | Status: DISCONTINUED | OUTPATIENT
Start: 2024-04-11 | End: 2024-04-15

## 2024-04-11 RX ORDER — SIMETHICONE 80 MG/1
80 TABLET, CHEWABLE ORAL EVERY 6 HOURS
Refills: 0 | Status: DISCONTINUED | OUTPATIENT
Start: 2024-04-11 | End: 2024-04-15

## 2024-04-11 RX ADMIN — Medication 280 MILLIGRAM(S): at 20:00

## 2024-04-11 RX ADMIN — Medication 2: at 19:20

## 2024-04-11 RX ADMIN — Medication 50 GRAM(S): at 17:55

## 2024-04-11 RX ADMIN — SODIUM CHLORIDE 125 MILLILITER(S): 9 INJECTION INTRAMUSCULAR; INTRAVENOUS; SUBCUTANEOUS at 20:04

## 2024-04-11 RX ADMIN — SODIUM CHLORIDE 3 MILLILITER(S): 9 INJECTION INTRAMUSCULAR; INTRAVENOUS; SUBCUTANEOUS at 22:28

## 2024-04-11 RX ADMIN — SODIUM CHLORIDE 125 MILLILITER(S): 9 INJECTION INTRAMUSCULAR; INTRAVENOUS; SUBCUTANEOUS at 17:55

## 2024-04-11 RX ADMIN — CHLORHEXIDINE GLUCONATE 1 APPLICATION(S): 213 SOLUTION TOPICAL at 08:49

## 2024-04-11 RX ADMIN — Medication 700 MILLIGRAM(S): at 21:00

## 2024-04-11 RX ADMIN — Medication 30 MILLIGRAM(S): at 22:03

## 2024-04-11 RX ADMIN — ENOXAPARIN SODIUM 40 MILLIGRAM(S): 100 INJECTION SUBCUTANEOUS at 22:04

## 2024-04-11 RX ADMIN — INSULIN GLARGINE 6 UNIT(S): 100 INJECTION, SOLUTION SUBCUTANEOUS at 22:03

## 2024-04-11 RX ADMIN — Medication 30 MILLIGRAM(S): at 23:03

## 2024-04-11 RX ADMIN — Medication 2: at 10:10

## 2024-04-11 NOTE — CHART NOTE - NSCHARTNOTEFT_GEN_A_CORE
GYNECOLOGIC ONCOLOGY PA Post Op NOTE    Patient seen and examined at bedside, resting comfortably with good pain control(s/p RS blocks). Patient denies headache, dizziness, nausea, vomiting, chest pain, palpitations and sob. Patient has yet to have anything to eat/drink. Jenkins in situ (light bluish/green urine in bag due to use of methylene blue in OR)    OBJECTIVE:     T(C): 36.4 (24 @ 16:10), Max: 36.4 (24 @ 16:10)  HR: 74 (24 @ 18:00) (74 - 82)  BP: 144/64 (24 @ 18:00) (118/57 - 150/63)  RR: 13 (24 @ 18:00) (12 - 17)  SpO2: 99% (24 @ 18:00) (95% - 100%)      24 @ 07:01  -  24 @ 18:17  --------------------------------------------------------  IN: 280 mL / OUT: 190 mL / NET: 90 mL        acetaminophen   IVPB .. 700 milliGRAM(s) IV Intermittent once  chlorhexidine 2% Cloths 1 Application(s) Topical daily  dextrose 10% Bolus 125 milliLiter(s) IV Bolus once  dextrose 5%. 1000 milliLiter(s) IV Continuous <Continuous>  dextrose 5%. 1000 milliLiter(s) IV Continuous <Continuous>  dextrose 50% Injectable 25 Gram(s) IV Push once  dextrose 50% Injectable 25 Gram(s) IV Push once  dextrose 50% Injectable 12.5 Gram(s) IV Push once  dextrose 50% Injectable 12.5 Gram(s) IV Push once  dextrose 50% Injectable 25 Gram(s) IV Push once  dextrose Oral Gel 15 Gram(s) Oral once  dextrose Oral Gel 15 Gram(s) Oral once PRN  enoxaparin Injectable 40 milliGRAM(s) SubCutaneous every 24 hours  glucagon  Injectable 1 milliGRAM(s) IntraMuscular once  glucagon  Injectable 1 milliGRAM(s) IntraMuscular once  HYDROmorphone  Injectable 0.5 milliGRAM(s) IV Push every 10 minutes PRN  insulin glargine Injectable (LANTUS) 6 Unit(s) SubCutaneous at bedtime  insulin lispro (ADMELOG) corrective regimen sliding scale   SubCutaneous three times a day before meals  insulin lispro (ADMELOG) corrective regimen sliding scale   SubCutaneous at bedtime  ketorolac   Injectable 30 milliGRAM(s) IV Push every 8 hours  levothyroxine 100 MICROGram(s) Oral daily  metoprolol tartrate Injectable 5 milliGRAM(s) IV Push every 6 hours PRN  ondansetron    Tablet 8 milliGRAM(s) Oral every 8 hours PRN  ondansetron Injectable 4 milliGRAM(s) IV Push once PRN  oxyCODONE    IR 5 milliGRAM(s) Oral every 3 hours PRN  sertraline 100 milliGRAM(s) Oral daily  simethicone 80 milliGRAM(s) Chew every 6 hours PRN  sodium chloride 0.9% lock flush 3 milliLiter(s) IV Push every 8 hours  sodium chloride 0.9%. 1000 milliLiter(s) IV Continuous <Continuous>      Physical Exam:  Constitutional: NAD  Pulmonary: clear to auscultation bilaterally   Cardiovascular: Regular rate and rhythm   Abdomen: soft, non-distended, appropriately tender, binder in place  Incision: midline vertical  w/dermabond prineo dressing C/D/I   Extremities: no lower extremity edema or calve tenderness, bilat venodynes in place      LABS:             8.0    25.57 )-----------( 585      ( -11 @ 16:38 )             26.8           135  |  101  |  16  ----------------------------<  225<H>  4.2   |  22  |  0.54    M.8  Phos: 3.6      ASSESSMENT/PLAN:  73 yo female s/p Total abdominal Hysterectomy with BSO and omentectomy,  para-aortic and pelvic lymphadenectomy, and Lysis of pelvic adhesions (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 See operative note for details. Hypomagnesemia noted on PACU labs.   -LR@125  -Lovenox QD  -advance diet as tolerated  -jenkins catheter until POD#2  - remove 2nd ucath in am   -IV Tylenol, Toradol, prn Oxycodone   -ISS and glucose monitoring (h/o T2DM)  -bilateral venodynes when resting in bed  -encourage ambulation and IS use  -VS per routine  -Mylicon/Senna  -Zofran prn  -Lopressor 5mg IVP q 6hrs prn (h/o HTN)  -Synthroid 100mcg QD (h/o Hypothyroid)  -appreciate GHOS recs  -Mg repletions ordered  (Mg 1.8)  -f/u am CBC/BMP-replete lytes prn       Disposition: admit to 4T for routine post operative care    Shaan Brooks PA-C  #[id number]

## 2024-04-11 NOTE — CONSULT NOTE ADULT - PROBLEM SELECTOR RECOMMENDATION 9
-given anticipated limited  po intake tonight, would reduce lantus from 12 to 6 U;  can increase to 12 tomorrow if po intake resumes  -c/w low premeal and qhs  ISS  -continue to hold oral hypoglycemic meds   -d/w gyn-onc

## 2024-04-11 NOTE — BRIEF OPERATIVE NOTE - COMMENTS
Seprafilm applied  Vistaseal applied Seprafilm applied  Patricia Marquez, BRIIFappjerry Pinon, Tracee.   CC

## 2024-04-11 NOTE — CONSULT NOTE ADULT - SUBJECTIVE AND OBJECTIVE BOX
73 yo female h/o iddm, htn,  s/p total abdominal Hysterectomy this afternoon with BSO and omentectomy,  para-aortic and pelvic lymphadenectomy, and Lysis of pelvic adhesions. Asked by GYN-Onc  to assist in glycemic control. Pt typically on 3 ADMELOG premeal, lantus 12U qhs, as well as januvia  and metformin per PST note. Post op random glucose 225. limited po intake now into the night  anticipated by Gyn-Onc         MEDICATIONS  (STANDING):  chlorhexidine 2% Cloths 1 Application(s) Topical daily  dextrose 10% Bolus 125 milliLiter(s) IV Bolus once  dextrose 5%. 1000 milliLiter(s) (100 mL/Hr) IV Continuous <Continuous>  dextrose 5%. 1000 milliLiter(s) (50 mL/Hr) IV Continuous <Continuous>  dextrose 50% Injectable 25 Gram(s) IV Push once  dextrose 50% Injectable 25 Gram(s) IV Push once  dextrose 50% Injectable 12.5 Gram(s) IV Push once  dextrose 50% Injectable 25 Gram(s) IV Push once  dextrose 50% Injectable 12.5 Gram(s) IV Push once  dextrose Oral Gel 15 Gram(s) Oral once  enoxaparin Injectable 40 milliGRAM(s) SubCutaneous every 24 hours  glucagon  Injectable 1 milliGRAM(s) IntraMuscular once  glucagon  Injectable 1 milliGRAM(s) IntraMuscular once  insulin glargine Injectable (LANTUS) 6 Unit(s) SubCutaneous at bedtime  insulin lispro (ADMELOG) corrective regimen sliding scale   SubCutaneous three times a day before meals  insulin lispro (ADMELOG) corrective regimen sliding scale   SubCutaneous at bedtime  ketorolac   Injectable 30 milliGRAM(s) IV Push every 8 hours  levothyroxine 100 MICROGram(s) Oral daily  sertraline 100 milliGRAM(s) Oral daily  sodium chloride 0.9% lock flush 3 milliLiter(s) IV Push every 8 hours  sodium chloride 0.9%. 1000 milliLiter(s) (125 mL/Hr) IV Continuous <Continuous>    MEDICATIONS  (PRN):  dextrose Oral Gel 15 Gram(s) Oral once PRN Blood Glucose LESS THAN 70 milliGRAM(s)/deciliter  metoprolol tartrate Injectable 5 milliGRAM(s) IV Push every 6 hours PRN hypertension  ondansetron    Tablet 8 milliGRAM(s) Oral every 8 hours PRN Nausea and/or Vomiting  oxyCODONE    IR 5 milliGRAM(s) Oral every 3 hours PRN Moderate Pain (4 - 6)  simethicone 80 milliGRAM(s) Chew every 6 hours PRN Gas          Review of Systems:   CONSTITUTIONAL: No fever,++  fatigue  EYES: No eye pain  ENMT:  No difficulty hearing  RESPIRATORY: No cough, wheezing, chills or hemoptysis; No shortness of breath  CARDIOVASCULAR: No chest pain  GASTROINTESTINAL: + abd pain (appropriate per gyn)      PHYSICAL EXAM:      Constitutional: NAD  HEENT:  EOMI, Normal Hearing  Neck: No LAD, No JVD  Back: Normal spine flexure, No CVA tenderness  Respiratory: CTAB  Cardiovascular: S1 and S2, RRR  Gastrointestinal: BS+, soft, appropriately tender   Extremities: No peripheral edema  Vascular: 2+ peripheral pulses  Neurological: sleepy, but lucid, follows commands   Psychiatric: Normal mood, normal affect  Musculoskeletal: 4/5 strength b/l upper and lower extremities                            8.0    25.57 )-----------( 585      ( 11 Apr 2024 16:38 )             26.8     04-11    135  |  101  |  16  ----------------------------<  225<H>  4.2   |  22  |  0.54    Ca    8.9      11 Apr 2024 16:38  Phos  3.6     04-11  Mg     1.80     04-11    TPro  7.1  /  Alb  2.5<L>  /  TBili  0.3  /  DBili  x   /  AST  14  /  ALT  10  /  AlkPhos  166<H>  04-11    CAPILLARY BLOOD GLUCOSE      POCT Blood Glucose.: 220 mg/dL (11 Apr 2024 21:16)  POCT Blood Glucose.: 239 mg/dL (11 Apr 2024 19:01)  POCT Blood Glucose.: 195 mg/dL (11 Apr 2024 16:13)  POCT Blood Glucose.: 286 mg/dL (11 Apr 2024 10:03)  POCT Blood Glucose.: 311 mg/dL (11 Apr 2024 09:58)  POCT Blood Glucose.: 289 mg/dL (11 Apr 2024 08:54)      Urinalysis Basic - ( 11 Apr 2024 16:38 )    Color: x / Appearance: x / SG: x / pH: x  Gluc: 225 mg/dL / Ketone: x  / Bili: x / Urobili: x   Blood: x / Protein: x / Nitrite: x   Leuk Esterase: x / RBC: x / WBC x   Sq Epi: x / Non Sq Epi: x / Bacteria: x      ICU Vital Signs Last 24 Hrs  T(C): 36.3 (11 Apr 2024 18:00), Max: 36.4 (11 Apr 2024 16:10)  T(F): 97.3 (11 Apr 2024 18:00), Max: 97.5 (11 Apr 2024 16:10)  HR: 77 (11 Apr 2024 19:00) (70 - 82)  BP: 150/69 (11 Apr 2024 19:00) (118/57 - 150/69)  BP(mean): 89 (11 Apr 2024 19:00) (71 - 89)  ABP: --  ABP(mean): --  RR: 13 (11 Apr 2024 19:00) (12 - 18)  SpO2: 99% (11 Apr 2024 19:00) (95% - 100%)    O2 Parameters below as of 11 Apr 2024 16:30  Patient On (Oxygen Delivery Method): room air

## 2024-04-11 NOTE — ASU PREOP CHECKLIST - BP NONINVASIVE SYSTOLIC (MM HG)
CARE AFTER EAR SURGERY  You have had an ear operation. There are a few things you should do after you go home.    If you are sent home with any medication, take as directed.    You may have slight pain in the ear. You can take Tylenol for the pain.    If the pain is severe or prolonged, you should seek medical help.    Activity: Adults may return to work or school.    Children may play outside.    No swimming until cleared by the doctor.     Complications:    Hearing loss.    Dizziness.    When to call the doctor:    Call [facility] at [phone]    If you have severe pain that is not controlled by Tylenol.    If you see excessive drainage coming out of the ear (some drainage is normal).      Created by: Dr. Torin Goldsmith Revised date: 10/2018      
124

## 2024-04-11 NOTE — BRIEF OPERATIVE NOTE - OPERATION/FINDINGS
EUA: mobile 18cm size uterus up to just below umbilicus.   Laparotomy: Globular and soft uterus. Fallopian tubes with appearance of prior tubal ligation. Normal ovaries. Normal omentum, bowel. Ureteral catheters palpable bilaterally. Mottled and firm pelvic lymph nodes bilaterally. 2cm simple appearing cyst in cul de sac. Mild adhesions between omentum and left adnexa. Good hemostasis at surgical sites. EUA: mobile 18cm size uterus up to just below umbilicus.   Laparotomy: Globular and soft uterus. Fallopian tubes with appearance of prior tubal ligation. Normal ovaries. Normal omentum - adherent to ant abdominal wall and left tube. Bowel nl. Mottled and firm pelvic lymph nodes bilaterally. 2cm simple appearing cyst in cul de sac. Gutters and subdiaphragms neg. 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 FS of enlarged left pelvic kvng package - bgn. BMB- neg.

## 2024-04-11 NOTE — PATIENT PROFILE ADULT - FALL HARM RISK - HARM RISK INTERVENTIONS

## 2024-04-11 NOTE — BRIEF OPERATIVE NOTE - NSICDXBRIEFPROCEDURE_GEN_ALL_CORE_FT
PROCEDURES:  Hysterectomy, total, abdominal, with BSO and omentectomy 11-Apr-2024 16:19:30  Lubna Campovered  Abdominal hysterectomy with para-aortic and pelvic lymphadenectomy 11-Apr-2024 16:22:34  Lubna Campoverde  Lysis of pelvic adhesions 11-Apr-2024 16:22:49  Lubna Campoverde  Pelvic examination under anesthesia 11-Apr-2024 16:23:08  Lubna Campoverde

## 2024-04-11 NOTE — BRIEF OPERATIVE NOTE - SPECIMENS
1) pelvic washings 2) adherent omentum 3) pelvic cyst 4) uterus and cervix 5) left ovary and fallopian tube 6) Right ovary and fallopian tube 7) left pelvic lymph node 8) right pelvic lymph node, 9) para-aortic lymph node 10) omentum 1) pelvic washings 2) adherent omentum 3) pelvic cyst 4) uterus and cervix 5) left ovary and fallopian tube 6) Right ovary and fallopian tube 7) left pelvic lymph node package 8) right pelvic lymph node package, 9) para-aortic lymph node package 10) omentum

## 2024-04-11 NOTE — ASU PREOP CHECKLIST - 1.
[place] [place]  ///// Patient living with type 2 diabetes - DEXCOM on back of left upper arm - last changed 4 days ago- site is [facility] [place]  ///// Patient living with type 2 diabetes - DEXCOM on back of left upper arm - last changed 4 days ago- site is [facility] DR Woodruff and Dr Cassidy aware

## 2024-04-12 DIAGNOSIS — C55 MALIGNANT NEOPLASM OF UTERUS, PART UNSPECIFIED: ICD-10-CM

## 2024-04-12 DIAGNOSIS — I10 ESSENTIAL (PRIMARY) HYPERTENSION: ICD-10-CM

## 2024-04-12 LAB
ADD ON TEST-SPECIMEN IN LAB: SIGNIFICANT CHANGE UP
ANION GAP SERPL CALC-SCNC: 11 MMOL/L — SIGNIFICANT CHANGE UP (ref 7–14)
BASOPHILS # BLD AUTO: 0.09 K/UL — SIGNIFICANT CHANGE UP (ref 0–0.2)
BASOPHILS NFR BLD AUTO: 0.6 % — SIGNIFICANT CHANGE UP (ref 0–2)
BUN SERPL-MCNC: 16 MG/DL — SIGNIFICANT CHANGE UP (ref 7–23)
CALCIUM SERPL-MCNC: 8.1 MG/DL — LOW (ref 8.4–10.5)
CHLORIDE SERPL-SCNC: 104 MMOL/L — SIGNIFICANT CHANGE UP (ref 98–107)
CO2 SERPL-SCNC: 21 MMOL/L — LOW (ref 22–31)
CREAT SERPL-MCNC: 0.72 MG/DL — SIGNIFICANT CHANGE UP (ref 0.5–1.3)
EGFR: 89 ML/MIN/1.73M2 — SIGNIFICANT CHANGE UP
EOSINOPHIL # BLD AUTO: 0.17 K/UL — SIGNIFICANT CHANGE UP (ref 0–0.5)
EOSINOPHIL NFR BLD AUTO: 1.1 % — SIGNIFICANT CHANGE UP (ref 0–6)
GLUCOSE BLDC GLUCOMTR-MCNC: 158 MG/DL — HIGH (ref 70–99)
GLUCOSE BLDC GLUCOMTR-MCNC: 225 MG/DL — HIGH (ref 70–99)
GLUCOSE BLDC GLUCOMTR-MCNC: 225 MG/DL — HIGH (ref 70–99)
GLUCOSE BLDC GLUCOMTR-MCNC: 235 MG/DL — HIGH (ref 70–99)
GLUCOSE BLDC GLUCOMTR-MCNC: 244 MG/DL — HIGH (ref 70–99)
GLUCOSE SERPL-MCNC: 157 MG/DL — HIGH (ref 70–99)
HCT VFR BLD CALC: 27.3 % — LOW (ref 34.5–45)
HGB BLD-MCNC: 8 G/DL — LOW (ref 11.5–15.5)
IANC: 11.87 K/UL — HIGH (ref 1.8–7.4)
IMM GRANULOCYTES NFR BLD AUTO: 1 % — HIGH (ref 0–0.9)
LYMPHOCYTES # BLD AUTO: 14.7 % — SIGNIFICANT CHANGE UP (ref 13–44)
LYMPHOCYTES # BLD AUTO: 2.2 K/UL — SIGNIFICANT CHANGE UP (ref 1–3.3)
MCHC RBC-ENTMCNC: 23.2 PG — LOW (ref 27–34)
MCHC RBC-ENTMCNC: 29.3 GM/DL — LOW (ref 32–36)
MCV RBC AUTO: 79.1 FL — LOW (ref 80–100)
MONOCYTES # BLD AUTO: 0.5 K/UL — SIGNIFICANT CHANGE UP (ref 0–0.9)
MONOCYTES NFR BLD AUTO: 3.3 % — SIGNIFICANT CHANGE UP (ref 2–14)
NEUTROPHILS # BLD AUTO: 11.87 K/UL — HIGH (ref 1.8–7.4)
NEUTROPHILS NFR BLD AUTO: 79.3 % — HIGH (ref 43–77)
NRBC # BLD: 0 /100 WBCS — SIGNIFICANT CHANGE UP (ref 0–0)
NRBC # FLD: 0 K/UL — SIGNIFICANT CHANGE UP (ref 0–0)
PLATELET # BLD AUTO: 602 K/UL — HIGH (ref 150–400)
POTASSIUM SERPL-MCNC: 4.1 MMOL/L — SIGNIFICANT CHANGE UP (ref 3.5–5.3)
POTASSIUM SERPL-SCNC: 4.1 MMOL/L — SIGNIFICANT CHANGE UP (ref 3.5–5.3)
RBC # BLD: 3.45 M/UL — LOW (ref 3.8–5.2)
RBC # FLD: 17.2 % — HIGH (ref 10.3–14.5)
SODIUM SERPL-SCNC: 136 MMOL/L — SIGNIFICANT CHANGE UP (ref 135–145)
T4 FREE SERPL-MCNC: 1.7 NG/DL — SIGNIFICANT CHANGE UP (ref 0.9–1.8)
TSH SERPL-MCNC: 2.05 UIU/ML — SIGNIFICANT CHANGE UP (ref 0.27–4.2)
WBC # BLD: 14.98 K/UL — HIGH (ref 3.8–10.5)
WBC # FLD AUTO: 14.98 K/UL — HIGH (ref 3.8–10.5)

## 2024-04-12 PROCEDURE — 99233 SBSQ HOSP IP/OBS HIGH 50: CPT

## 2024-04-12 RX ORDER — METOPROLOL TARTRATE 50 MG
25 TABLET ORAL
Refills: 0 | Status: DISCONTINUED | OUTPATIENT
Start: 2024-04-12 | End: 2024-04-15

## 2024-04-12 RX ORDER — METOPROLOL TARTRATE 50 MG
25 TABLET ORAL
Refills: 0 | Status: DISCONTINUED | OUTPATIENT
Start: 2024-04-12 | End: 2024-04-12

## 2024-04-12 RX ORDER — CALCIUM CARBONATE 500(1250)
1 TABLET ORAL ONCE
Refills: 0 | Status: COMPLETED | OUTPATIENT
Start: 2024-04-12 | End: 2024-04-12

## 2024-04-12 RX ORDER — ACETAMINOPHEN 500 MG
650 TABLET ORAL EVERY 6 HOURS
Refills: 0 | Status: DISCONTINUED | OUTPATIENT
Start: 2024-04-12 | End: 2024-04-15

## 2024-04-12 RX ORDER — METOPROLOL TARTRATE 50 MG
25 TABLET ORAL EVERY 12 HOURS
Refills: 0 | Status: DISCONTINUED | OUTPATIENT
Start: 2024-04-12 | End: 2024-04-12

## 2024-04-12 RX ORDER — INSULIN GLARGINE 100 [IU]/ML
12 INJECTION, SOLUTION SUBCUTANEOUS AT BEDTIME
Refills: 0 | Status: DISCONTINUED | OUTPATIENT
Start: 2024-04-12 | End: 2024-04-12

## 2024-04-12 RX ORDER — APIXABAN 2.5 MG/1
1 TABLET, FILM COATED ORAL
Qty: 56 | Refills: 0
Start: 2024-04-12

## 2024-04-12 RX ORDER — IBUPROFEN 200 MG
600 TABLET ORAL EVERY 6 HOURS
Refills: 0 | Status: DISCONTINUED | OUTPATIENT
Start: 2024-04-12 | End: 2024-04-15

## 2024-04-12 RX ORDER — SENNA PLUS 8.6 MG/1
2 TABLET ORAL AT BEDTIME
Refills: 0 | Status: DISCONTINUED | OUTPATIENT
Start: 2024-04-12 | End: 2024-04-14

## 2024-04-12 RX ORDER — INSULIN GLARGINE 100 [IU]/ML
10 INJECTION, SOLUTION SUBCUTANEOUS AT BEDTIME
Refills: 0 | Status: DISCONTINUED | OUTPATIENT
Start: 2024-04-12 | End: 2024-04-15

## 2024-04-12 RX ADMIN — SODIUM CHLORIDE 3 MILLILITER(S): 9 INJECTION INTRAMUSCULAR; INTRAVENOUS; SUBCUTANEOUS at 06:47

## 2024-04-12 RX ADMIN — INSULIN GLARGINE 10 UNIT(S): 100 INJECTION, SOLUTION SUBCUTANEOUS at 21:43

## 2024-04-12 RX ADMIN — Medication 650 MILLIGRAM(S): at 18:46

## 2024-04-12 RX ADMIN — SERTRALINE 100 MILLIGRAM(S): 25 TABLET, FILM COATED ORAL at 12:00

## 2024-04-12 RX ADMIN — Medication 650 MILLIGRAM(S): at 11:00

## 2024-04-12 RX ADMIN — Medication 1 TABLET(S): at 08:24

## 2024-04-12 RX ADMIN — SENNA PLUS 2 TABLET(S): 8.6 TABLET ORAL at 21:43

## 2024-04-12 RX ADMIN — SIMETHICONE 80 MILLIGRAM(S): 80 TABLET, CHEWABLE ORAL at 14:36

## 2024-04-12 RX ADMIN — Medication 2: at 17:51

## 2024-04-12 RX ADMIN — Medication 30 MILLIGRAM(S): at 06:06

## 2024-04-12 RX ADMIN — SODIUM CHLORIDE 3 MILLILITER(S): 9 INJECTION INTRAMUSCULAR; INTRAVENOUS; SUBCUTANEOUS at 14:26

## 2024-04-12 RX ADMIN — Medication 30 MILLIGRAM(S): at 14:36

## 2024-04-12 RX ADMIN — Medication 25 MILLIGRAM(S): at 18:46

## 2024-04-12 RX ADMIN — Medication 600 MILLIGRAM(S): at 20:04

## 2024-04-12 RX ADMIN — SODIUM CHLORIDE 125 MILLILITER(S): 9 INJECTION INTRAMUSCULAR; INTRAVENOUS; SUBCUTANEOUS at 08:27

## 2024-04-12 RX ADMIN — Medication 30 MILLIGRAM(S): at 07:06

## 2024-04-12 RX ADMIN — Medication 1: at 08:25

## 2024-04-12 RX ADMIN — SODIUM CHLORIDE 125 MILLILITER(S): 9 INJECTION INTRAMUSCULAR; INTRAVENOUS; SUBCUTANEOUS at 06:06

## 2024-04-12 RX ADMIN — SODIUM CHLORIDE 125 MILLILITER(S): 9 INJECTION INTRAMUSCULAR; INTRAVENOUS; SUBCUTANEOUS at 16:59

## 2024-04-12 RX ADMIN — ENOXAPARIN SODIUM 40 MILLIGRAM(S): 100 INJECTION SUBCUTANEOUS at 21:43

## 2024-04-12 RX ADMIN — Medication 650 MILLIGRAM(S): at 10:03

## 2024-04-12 RX ADMIN — Medication 2: at 11:59

## 2024-04-12 RX ADMIN — Medication 600 MILLIGRAM(S): at 21:04

## 2024-04-12 RX ADMIN — SODIUM CHLORIDE 3 MILLILITER(S): 9 INJECTION INTRAMUSCULAR; INTRAVENOUS; SUBCUTANEOUS at 21:43

## 2024-04-12 RX ADMIN — Medication 650 MILLIGRAM(S): at 19:46

## 2024-04-12 RX ADMIN — Medication 100 MICROGRAM(S): at 06:06

## 2024-04-12 RX ADMIN — Medication 30 MILLIGRAM(S): at 15:36

## 2024-04-12 NOTE — CHART NOTE - NSCHARTNOTEFT_GEN_A_CORE
Patient evaluated at bedside this afternoon. Reports some gas pain but otherwise asymptomatic. Not yet passing flatus. OOB to chair. Tolerating regular diet. Pinon is in place. Not yet ambulating further than to the chair but is planning to walk now.        Objective  T(C): 36.6 (04-12-24 @ 14:15), Max: 36.6 (04-12-24 @ 09:45)  HR: 92 (04-12-24 @ 14:15) (82 - 92)  BP: 118/55 (04-12-24 @ 14:15) (116/49 - 125/58)  RR: 17 (04-12-24 @ 14:15) (16 - 17)  SpO2: 99% (04-12-24 @ 14:15) (96% - 100%)  Wt(kg): --    04-11 @ 07:01  -  04-12 @ 07:00  --------------------------------------------------------  IN: 2110 mL / OUT: 770 mL / NET: 1340 mL    04-12 @ 07:01  -  04-12 @ 17:05  --------------------------------------------------------  IN: 1640 mL / OUT: 175 mL / NET: 1465 mL        Gen: NAD    acetaminophen     Tablet .. 650 milliGRAM(s) Oral every 6 hours  dextrose 10% Bolus 125 milliLiter(s) IV Bolus once  dextrose 5%. 1000 milliLiter(s) IV Continuous <Continuous>  dextrose 5%. 1000 milliLiter(s) IV Continuous <Continuous>  dextrose 50% Injectable 25 Gram(s) IV Push once  dextrose 50% Injectable 25 Gram(s) IV Push once  dextrose 50% Injectable 25 Gram(s) IV Push once  dextrose 50% Injectable 12.5 Gram(s) IV Push once  dextrose 50% Injectable 12.5 Gram(s) IV Push once  dextrose Oral Gel 15 Gram(s) Oral once  dextrose Oral Gel 15 Gram(s) Oral once PRN  enoxaparin Injectable 40 milliGRAM(s) SubCutaneous every 24 hours  glucagon  Injectable 1 milliGRAM(s) IntraMuscular once  glucagon  Injectable 1 milliGRAM(s) IntraMuscular once  ibuprofen  Tablet. 600 milliGRAM(s) Oral every 6 hours  insulin glargine Injectable (LANTUS) 10 Unit(s) SubCutaneous at bedtime  insulin lispro (ADMELOG) corrective regimen sliding scale   SubCutaneous at bedtime  insulin lispro (ADMELOG) corrective regimen sliding scale   SubCutaneous three times a day before meals  levothyroxine 100 MICROGram(s) Oral daily  metoprolol tartrate 25 milliGRAM(s) Oral two times a day  metoprolol tartrate Injectable 5 milliGRAM(s) IV Push every 6 hours PRN  ondansetron    Tablet 8 milliGRAM(s) Oral every 8 hours PRN  oxyCODONE    IR 5 milliGRAM(s) Oral every 3 hours PRN  senna 2 Tablet(s) Oral at bedtime  sertraline 100 milliGRAM(s) Oral daily  simethicone 80 milliGRAM(s) Chew every 6 hours PRN  sodium chloride 0.9% lock flush 3 milliLiter(s) IV Push every 8 hours  sodium chloride 0.9%. 1000 milliLiter(s) IV Continuous <Continuous>          Interval Updates:  - Not yet passing flatus  - Restarted on home Metoprolol 25 mg BID for palpitations (with hold parameters)  - Increased to Lantus 10u qhs  - Apixaban at bedside    D/w GYN ONC team  HSinks PGY2

## 2024-04-12 NOTE — CHART NOTE - NSCHARTNOTEFT_GEN_A_CORE
Home Apixaban Note    Apixaban Rx sent to [facility] using the Free Coupon. The Apixaban was picked up by me and given to the pt at bedside. Medication instructions reviewed.  Gyn Onc Team notified of above.

## 2024-04-12 NOTE — PROGRESS NOTE ADULT - SUBJECTIVE AND OBJECTIVE BOX
[facility] Division of [facility]  Maria Isabel Vitale MD  Pager [phone]      Patient is a 74y old  Female who presents with a chief complaint of hysterectomy (11 Apr 2024 17:39)      SUBJECTIVE / OVERNIGHT EVENTS:    hyperthermic o/n, improved with warming blanket.   pain controlled. reports eating most of the food offered. no new complaints     ADDITIONAL REVIEW OF SYSTEMS:    RESPIRATORY: No cough, wheezing, chills or hemoptysis; No shortness of breath  CARDIOVASCULAR: No chest pain, palpitations, dizziness, or leg swelling  GASTROINTESTINAL: No abdominal or epigastric pain. No nausea, vomiting, or hematemesis; No diarrhea or constipation. No melena or hematochezia.      MEDICATIONS  (STANDING):  acetaminophen     Tablet .. 650 milliGRAM(s) Oral every 6 hours  dextrose 10% Bolus 125 milliLiter(s) IV Bolus once  dextrose 5%. 1000 milliLiter(s) (50 mL/Hr) IV Continuous <Continuous>  dextrose 5%. 1000 milliLiter(s) (100 mL/Hr) IV Continuous <Continuous>  dextrose 50% Injectable 25 Gram(s) IV Push once  dextrose 50% Injectable 25 Gram(s) IV Push once  dextrose 50% Injectable 25 Gram(s) IV Push once  dextrose 50% Injectable 12.5 Gram(s) IV Push once  dextrose 50% Injectable 12.5 Gram(s) IV Push once  dextrose Oral Gel 15 Gram(s) Oral once  enoxaparin Injectable 40 milliGRAM(s) SubCutaneous every 24 hours  glucagon  Injectable 1 milliGRAM(s) IntraMuscular once  glucagon  Injectable 1 milliGRAM(s) IntraMuscular once  insulin glargine Injectable (LANTUS) 12 Unit(s) SubCutaneous at bedtime  insulin lispro (ADMELOG) corrective regimen sliding scale   SubCutaneous three times a day before meals  insulin lispro (ADMELOG) corrective regimen sliding scale   SubCutaneous at bedtime  ketorolac   Injectable 30 milliGRAM(s) IV Push every 8 hours  levothyroxine 100 MICROGram(s) Oral daily  metoprolol tartrate 25 milliGRAM(s) Oral two times a day  sertraline 100 milliGRAM(s) Oral daily  sodium chloride 0.9% lock flush 3 milliLiter(s) IV Push every 8 hours  sodium chloride 0.9%. 1000 milliLiter(s) (125 mL/Hr) IV Continuous <Continuous>    MEDICATIONS  (PRN):  dextrose Oral Gel 15 Gram(s) Oral once PRN Blood Glucose LESS THAN 70 milliGRAM(s)/deciliter  metoprolol tartrate Injectable 5 milliGRAM(s) IV Push every 6 hours PRN hypertension  ondansetron    Tablet 8 milliGRAM(s) Oral every 8 hours PRN Nausea and/or Vomiting  oxyCODONE    IR 5 milliGRAM(s) Oral every 3 hours PRN Moderate Pain (4 - 6)  simethicone 80 milliGRAM(s) Chew every 6 hours PRN Gas      CAPILLARY BLOOD GLUCOSE      POCT Blood Glucose.: 225 mg/dL (12 Apr 2024 10:00)  POCT Blood Glucose.: 158 mg/dL (12 Apr 2024 07:44)  POCT Blood Glucose.: 220 mg/dL (11 Apr 2024 21:16)  POCT Blood Glucose.: 239 mg/dL (11 Apr 2024 19:01)  POCT Blood Glucose.: 195 mg/dL (11 Apr 2024 16:13)    I&O's Summary    11 Apr 2024 07:01  -  12 Apr 2024 07:00  --------------------------------------------------------  IN: 2110 mL / OUT: 770 mL / NET: 1340 mL        PHYSICAL EXAM:  Vital Signs Last 24 Hrs  T(C): 36.4 (12 Apr 2024 06:01), Max: 36.4 (11 Apr 2024 16:10)  T(F): 97.5 (12 Apr 2024 06:01), Max: 97.5 (11 Apr 2024 16:10)  HR: 82 (12 Apr 2024 06:01) (64 - 82)  BP: 125/58 (12 Apr 2024 06:01) (118/57 - 150/69)  BP(mean): 89 (11 Apr 2024 19:00) (71 - 89)  RR: 16 (12 Apr 2024 06:01) (12 - 18)  SpO2: 96% (12 Apr 2024 06:01) (95% - 100%)    Parameters below as of 12 Apr 2024 06:01  Patient On (Oxygen Delivery Method): room air        CONSTITUTIONAL: NAD,  EYES: PERRLA; conjunctiva and sclera clear  ENMT: Moist oral mucosa, no pharyngeal injection or exudates;   NECK: Supple, no palpable masses;  RESPIRATORY: Normal respiratory effort; lungs are clear to auscultation bilaterally  CARDIOVASCULAR: Regular rate and rhythm, normal S1 and S2, no murmur/rub/gallop; No lower extremity edema; Peripheral pulses are 2+ bilaterally  ABDOMEN: Nontender to palpation, normoactive bowel sounds, no rebound/guarding; jenkins in place   MUSCLOSKELETAL:   no clubbing or cyanosis of digits; no joint swelling or tenderness to palpation  PSYCH: A+O to person, place, and time; affect appropriate  NEUROLOGY: CN 2-12 are intact and symmetric; no gross sensory deficits;   SKIN: No rashes;     LABS:                        8.0    14.98 )-----------( 602      ( 12 Apr 2024 06:14 )             27.3     04-12    136  |  104  |  16  ----------------------------<  157<H>  4.1   |  21<L>  |  0.72    Ca    8.1<L>      12 Apr 2024 06:14  Phos  3.6     04-11  Mg     1.80     04-11    TPro  7.1  /  Alb  2.5<L>  /  TBili  0.3  /  DBili  x   /  AST  14  /  ALT  10  /  AlkPhos  166<H>  04-11          Urinalysis Basic - ( 12 Apr 2024 06:14 )    Color: x / Appearance: x / SG: x / pH: x  Gluc: 157 mg/dL / Ketone: x  / Bili: x / Urobili: x   Blood: x / Protein: x / Nitrite: x   Leuk Esterase: x / RBC: x / WBC x   Sq Epi: x / Non Sq Epi: x / Bacteria: x          RADIOLOGY & ADDITIONAL TESTS:  Results Reviewed:   Imaging Personally Reviewed:  Electrocardiogram Personally Reviewed:    COORDINATION OF CARE:  Care Discussed with Consultants/Other Providers [Y/N]:  Prior or Outpatient Records Reviewed [Y/N]:

## 2024-04-12 NOTE — PROVIDER CONTACT NOTE (OTHER) - BACKGROUND
s/p Total abdominal hysterectomy with BSO and omentectomy  with para-aortic and pelvic lymphadenectomy, Lysis of pelvic adhesions   Pelvic examination under anesthesia 4/11/24  
Patient's A1C 7.6 Patient did not take diabetes medication today
s/p TAHBSO, omentectomy w/ para-aortic and pelvic lymphadenectomy

## 2024-04-12 NOTE — PROVIDER CONTACT NOTE (OTHER) - ACTION/TREATMENT ORDERED:
Full report given to OR RN
MD made aware, rectal temp ordered, pt. cover with extra blankets , room temperature increased, warm packs applied, warming blanket ordered ,awaiting arrival from central supplies, ANMs made aware.
PA made aware. No orders made.

## 2024-04-12 NOTE — PROVIDER CONTACT NOTE (OTHER) - RECOMMENDATIONS
Patient received Subcutaneous addemolog 2 Units at 10:08 . Patient escorted to OR via wheelchair as per Dr Woodruff and Dr Cassidy
warming blanket , rectal temp
RN clarified need for bolus.

## 2024-04-12 NOTE — PROGRESS NOTE ADULT - ASSESSMENT
75 yo F with hx of HTN, IDDM, hypothyroidism, CLL not on treatment, uterine ca s/p MARTÍN/BSO, omentectomy 4/11. medicine consulted for comanagement

## 2024-04-12 NOTE — CHART NOTE - NSCHARTNOTEFT_GEN_A_CORE
0154    Myself at the bedside to evaluate the patient after being notified that patient was 94*F oral on routine overnight vitals    Patient states that she was just sleeping prior to evaluation. Denies drinking any fluids or anything else prior to vitals. Denies any fevers, chills, or any other complaints.     Vital Signs Last 24 Hrs  T(C): 36.3 (11 Apr 2024 23:15), Max: 36.4 (11 Apr 2024 16:10)  T(F): 97.4 (11 Apr 2024 23:15), Max: 97.5 (11 Apr 2024 16:10)  HR: 64 (12 Apr 2024 01:30) (64 - 82)  BP: 133/58 (12 Apr 2024 01:30) (118/57 - 150/69)  BP(mean): 89 (11 Apr 2024 19:00) (71 - 89)  RR: 18 (12 Apr 2024 01:30) (12 - 18)  SpO2: 97% (12 Apr 2024 01:30) (95% - 100%)    Parameters below as of 12 Apr 2024 01:30  Patient On (Oxygen Delivery Method): room air    Gen: No acute distress. Awake. Alert  Pulm: Unlabored breathing. No respiratory distress  Derm: Not cool or warm to the touch    75 yo female s/p Total abdominal Hysterectomy with BSO and omentectomy,  para-aortic and pelvic lymphadenectomy, and Lysis of pelvic adhesions (uterine frozen=high grade neoplasm, left pelvic lymph node=benign) with overnight vitals indicating hypothermia of unclear significance-questionably aberrant. Rectal temperature pending to confirm  - Will follow-up rectal temperature prior to any further interventions    Bill Atwood  PGY-2, Obstetrics & Gynecology 0154    Myself at the bedside to evaluate the patient after being notified that patient was 94*F oral on routine overnight vitals    Patient states that she was just sleeping prior to evaluation. Denies drinking any fluids or anything else prior to vitals. Denies any fevers, chills, or any other complaints.     Vital Signs Last 24 Hrs  T(C): 36.3 (11 Apr 2024 23:15), Max: 36.4 (11 Apr 2024 16:10)  T(F): 97.4 (11 Apr 2024 23:15), Max: 97.5 (11 Apr 2024 16:10)  HR: 64 (12 Apr 2024 01:30) (64 - 82)  BP: 133/58 (12 Apr 2024 01:30) (118/57 - 150/69)  BP(mean): 89 (11 Apr 2024 19:00) (71 - 89)  RR: 18 (12 Apr 2024 01:30) (12 - 18)  SpO2: 97% (12 Apr 2024 01:30) (95% - 100%)    Parameters below as of 12 Apr 2024 01:30  Patient On (Oxygen Delivery Method): room air    Gen: No acute distress. Awake. Alert  Pulm: Unlabored breathing. No respiratory distress  Derm: Not cool or warm to the touch    73 yo female s/p Total abdominal Hysterectomy with BSO and omentectomy,  para-aortic and pelvic lymphadenectomy, and Lysis of pelvic adhesions (uterine frozen=high grade neoplasm, left pelvic lymph node=benign) with overnight vitals indicating hypothermia of unclear significance-questionably aberrant. Rectal temperature pending to confirm  - Will follow-up rectal temperature prior to any further interventions    Addendum @ 0220  - Rectal temperature 94.4*F. Will utilize blankets and hotpacks in the interim until warming blanket system    Bill Atwood  PGY-2, Obstetrics & Gynecology    d/w Dr. ETHAN Mejias, gyn/onc fellow

## 2024-04-12 NOTE — CHART NOTE - NSCHARTNOTEFT_GEN_A_CORE
2202    At bedside to evaluate patient given reported numbness/decreased sensation on the left outer aspect of the patient's thigh. Patient denies any pain other any other complaints at time of evaluation. Says she was otherwise able to walk today    Extremities/neuro: Symmetric strength in b/l lower extremities grossly with plantarflexion, dorsiflexion, and raising of legs. Sensation along left outer thigh noted to be intact w/ pinching by the examiner    Reviewed with patient and family at the bedside that positioning in the immediate post-op period is likely contributing to a potential peripheral nerve injury  - Recommended continuing to monitor, ambulating as tolerated, and position changes    Bill Atwood  PGY-2, Obstetrics & Gynecology

## 2024-04-12 NOTE — PROVIDER CONTACT NOTE (OTHER) - ASSESSMENT
Patient asymptomatic Dr ordered Addemolog sliding scale . FS prior to Administer insulin @ 9:55 - 311 Dr Hurley ( anesthesia ) requested to repeat FS  at 10:08 FS - 286
Urine output from jenkins  6am-10pm= 75cc, denies discomfort, IVF @ 115cc/hr NS
pt. awake, alert and oriented x4, denies chills , shivering, stated " I'm not cold", surgical site no complication observed,

## 2024-04-12 NOTE — PROGRESS NOTE ADULT - ASSESSMENT
A/P: 74y POD#1 s/p total abdominal hysterectomy, bilateral salpingo-oophorectomy, pelvic and para-aortic lymph nod dissection, lysis of adhesion, cystoscopy and ureteral catheter placement by urology (Dr. Guillaume) for uterine mass. 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 Overnight patient was asymptomatically hypothermic and is now normothermic with Bairhugger in place. Remaining ureteral catheter removed this AM.    Neuro: s/p RS blocks. IV Tylenol, Toradol, Oxy PRN.  - Transition to oral pain medication  - h/o Depression, c/w home Sertraline 100mg  CV: Hemodynamically stable  - Anemia at baseline, post op H/H appropriate for : 8.2/28.8->8.0/26.8->8.0/27.3  - h/o HTN: 120-140/5-60s overnight, Metoprolol 5 IVP q6 hr PRN  - h/o palpitations: add home Metoprolol 25mg q12 hr  Pulm: Saturating well on room air, encourage oob/amb  GI: Continue regular diet  : UOP adequate at 480 overnight (40cc/hr)  - Pinon for 48hrs (until 4/13)  - s/p ureteral catheter removal  Heme: Lovenox and SCDs for DVT ppx  - Discharge on Eliquis  FEN: NS@125.  Replete electrolytes prn   ID: Hypothermic overnight, now normothermic  - c/w Bairhugger and monitor temperature  - Baseline leukocytosis 2/2 CLL diagnosis, trend: 26->25->15  Endo: T2DM, hypothyroidism  - Appreciate GHOS recs: Lantus 6u last night, may increase to 12u today, low dose ISS premeal and bedtime, hold Metformin  - FS overnight 220-239, goal FS <200  - c/w Synthroid 100 mcg  Dispo: Continue routine post-op care      seen w/ GYN Onc team  BHAVESH Campoverde, PGY3

## 2024-04-12 NOTE — PROGRESS NOTE ADULT - SUBJECTIVE AND OBJECTIVE BOX
SANDRA MARY Progress Note    POD#1   HD#2    Patient seen and examined at bedside. Patient was incidentally hypothermic overnight to 34.4C without symptoms at the time. Bairhugger was placed and patient is now normothermic. No acute complaints.  Pain well controlled.  Has not yet passed flatus. Pinon is still in place.   Denies CP, SOB, N/V, fevers, and chills.    Vital Signs Last 24 Hours  T(C): 36.4 (04-12-24 @ 06:01), Max: 36.4 (04-11-24 @ 16:10)  HR: 82 (04-12-24 @ 06:01) (64 - 82)  BP: 125/58 (04-12-24 @ 06:01) (118/57 - 150/69)  RR: 16 (04-12-24 @ 06:01) (12 - 18)  SpO2: 96% (04-12-24 @ 06:01) (95% - 100%)    I&O's Summary    11 Apr 2024 07:01  -  12 Apr 2024 06:44  --------------------------------------------------------  IN: 1490 mL / OUT: 640 mL / NET: 850 mL        Physical Exam:  General: NAD  CV: RR, S1, S2, no M/R/G  Lungs: CTA b/l, good air flow b/l   Abdomen: Soft, mildly-tender to palpation diffusely, softly distended, normoactive bowel sounds  Incision: midline vertical c/d/i with Dermabond Prineo and abdominal   : no bleeding on pad  Ext: No pain or swelling     Labs:                        8.0    25.57 )-----------( 585      ( 11 Apr 2024 16:38 )             26.8   baso 0.9    eos 0.8    imm gran x      lymph 4.3    mono 2.6    poly 91.4       MEDICATIONS  (STANDING):  acetaminophen   IVPB .. 700 milliGRAM(s) IV Intermittent once  chlorhexidine 2% Cloths 1 Application(s) Topical daily  dextrose 10% Bolus 125 milliLiter(s) IV Bolus once  dextrose 5%. 1000 milliLiter(s) (100 mL/Hr) IV Continuous <Continuous>  dextrose 5%. 1000 milliLiter(s) (50 mL/Hr) IV Continuous <Continuous>  dextrose 50% Injectable 25 Gram(s) IV Push once  dextrose 50% Injectable 25 Gram(s) IV Push once  dextrose 50% Injectable 12.5 Gram(s) IV Push once  dextrose 50% Injectable 25 Gram(s) IV Push once  dextrose 50% Injectable 12.5 Gram(s) IV Push once  dextrose Oral Gel 15 Gram(s) Oral once  enoxaparin Injectable 40 milliGRAM(s) SubCutaneous every 24 hours  glucagon  Injectable 1 milliGRAM(s) IntraMuscular once  glucagon  Injectable 1 milliGRAM(s) IntraMuscular once  insulin glargine Injectable (LANTUS) 6 Unit(s) SubCutaneous at bedtime  insulin lispro (ADMELOG) corrective regimen sliding scale   SubCutaneous three times a day before meals  insulin lispro (ADMELOG) corrective regimen sliding scale   SubCutaneous at bedtime  ketorolac   Injectable 30 milliGRAM(s) IV Push every 8 hours  levothyroxine 100 MICROGram(s) Oral daily  sertraline 100 milliGRAM(s) Oral daily  sodium chloride 0.9% lock flush 3 milliLiter(s) IV Push every 8 hours  sodium chloride 0.9%. 1000 milliLiter(s) (125 mL/Hr) IV Continuous <Continuous>    MEDICATIONS  (PRN):  dextrose Oral Gel 15 Gram(s) Oral once PRN Blood Glucose LESS THAN 70 milliGRAM(s)/deciliter  metoprolol tartrate Injectable 5 milliGRAM(s) IV Push every 6 hours PRN hypertension  ondansetron    Tablet 8 milliGRAM(s) Oral every 8 hours PRN Nausea and/or Vomiting  oxyCODONE    IR 5 milliGRAM(s) Oral every 3 hours PRN Moderate Pain (4 - 6)  simethicone 80 milliGRAM(s) Chew every 6 hours PRN Gas

## 2024-04-12 NOTE — PROGRESS NOTE ADULT - NSPROGADDITIONALINFOA_GEN_ALL_CORE
Seen in f/u. Labs and flow reviewed. D/W PA and resident. Surgery and findings again reviewed. Appreciate [facility] collaboration. Coverage and instructions discussed. All Q/A.

## 2024-04-12 NOTE — PROVIDER CONTACT NOTE (OTHER) - SITUATION
Urine output from jenkins  6am-10pm= 75cc, IVF NS @125cc/hr
pt. temp 94F oral
Patient has history of type 2 diabetes FS pre op 289 @ 8:55 am

## 2024-04-13 LAB
ANION GAP SERPL CALC-SCNC: 10 MMOL/L — SIGNIFICANT CHANGE UP (ref 7–14)
BASOPHILS # BLD AUTO: 0.09 K/UL — SIGNIFICANT CHANGE UP (ref 0–0.2)
BASOPHILS NFR BLD AUTO: 0.5 % — SIGNIFICANT CHANGE UP (ref 0–2)
BLD GP AB SCN SERPL QL: NEGATIVE — SIGNIFICANT CHANGE UP
BUN SERPL-MCNC: 16 MG/DL — SIGNIFICANT CHANGE UP (ref 7–23)
CALCIUM SERPL-MCNC: 8.3 MG/DL — LOW (ref 8.4–10.5)
CHLORIDE SERPL-SCNC: 109 MMOL/L — HIGH (ref 98–107)
CO2 SERPL-SCNC: 19 MMOL/L — LOW (ref 22–31)
CREAT SERPL-MCNC: 0.89 MG/DL — SIGNIFICANT CHANGE UP (ref 0.5–1.3)
EGFR: 69 ML/MIN/1.73M2 — SIGNIFICANT CHANGE UP
EOSINOPHIL # BLD AUTO: 0.57 K/UL — HIGH (ref 0–0.5)
EOSINOPHIL NFR BLD AUTO: 3.1 % — SIGNIFICANT CHANGE UP (ref 0–6)
GLUCOSE BLDC GLUCOMTR-MCNC: 131 MG/DL — HIGH (ref 70–99)
GLUCOSE BLDC GLUCOMTR-MCNC: 166 MG/DL — HIGH (ref 70–99)
GLUCOSE BLDC GLUCOMTR-MCNC: 179 MG/DL — HIGH (ref 70–99)
GLUCOSE BLDC GLUCOMTR-MCNC: 187 MG/DL — HIGH (ref 70–99)
GLUCOSE SERPL-MCNC: 145 MG/DL — HIGH (ref 70–99)
HCT VFR BLD CALC: 28.7 % — LOW (ref 34.5–45)
HGB BLD-MCNC: 8.4 G/DL — LOW (ref 11.5–15.5)
IANC: 15.83 K/UL — HIGH (ref 1.8–7.4)
IMM GRANULOCYTES NFR BLD AUTO: 0.9 % — SIGNIFICANT CHANGE UP (ref 0–0.9)
LYMPHOCYTES # BLD AUTO: 1.42 K/UL — SIGNIFICANT CHANGE UP (ref 1–3.3)
LYMPHOCYTES # BLD AUTO: 7.7 % — LOW (ref 13–44)
MCHC RBC-ENTMCNC: 23.5 PG — LOW (ref 27–34)
MCHC RBC-ENTMCNC: 29.3 GM/DL — LOW (ref 32–36)
MCV RBC AUTO: 80.4 FL — SIGNIFICANT CHANGE UP (ref 80–100)
MONOCYTES # BLD AUTO: 0.4 K/UL — SIGNIFICANT CHANGE UP (ref 0–0.9)
MONOCYTES NFR BLD AUTO: 2.2 % — SIGNIFICANT CHANGE UP (ref 2–14)
NEUTROPHILS # BLD AUTO: 15.83 K/UL — HIGH (ref 1.8–7.4)
NEUTROPHILS NFR BLD AUTO: 85.6 % — HIGH (ref 43–77)
NRBC # BLD: 0 /100 WBCS — SIGNIFICANT CHANGE UP (ref 0–0)
NRBC # FLD: 0 K/UL — SIGNIFICANT CHANGE UP (ref 0–0)
PLATELET # BLD AUTO: 659 K/UL — HIGH (ref 150–400)
POTASSIUM SERPL-MCNC: 4 MMOL/L — SIGNIFICANT CHANGE UP (ref 3.5–5.3)
POTASSIUM SERPL-SCNC: 4 MMOL/L — SIGNIFICANT CHANGE UP (ref 3.5–5.3)
RBC # BLD: 3.57 M/UL — LOW (ref 3.8–5.2)
RBC # FLD: 17.1 % — HIGH (ref 10.3–14.5)
RH IG SCN BLD-IMP: POSITIVE — SIGNIFICANT CHANGE UP
SODIUM SERPL-SCNC: 138 MMOL/L — SIGNIFICANT CHANGE UP (ref 135–145)
WBC # BLD: 18.48 K/UL — HIGH (ref 3.8–10.5)
WBC # FLD AUTO: 18.48 K/UL — HIGH (ref 3.8–10.5)

## 2024-04-13 PROCEDURE — 99232 SBSQ HOSP IP/OBS MODERATE 35: CPT

## 2024-04-13 RX ADMIN — Medication 650 MILLIGRAM(S): at 02:00

## 2024-04-13 RX ADMIN — SODIUM CHLORIDE 3 MILLILITER(S): 9 INJECTION INTRAMUSCULAR; INTRAVENOUS; SUBCUTANEOUS at 05:07

## 2024-04-13 RX ADMIN — SIMETHICONE 80 MILLIGRAM(S): 80 TABLET, CHEWABLE ORAL at 05:08

## 2024-04-13 RX ADMIN — Medication 650 MILLIGRAM(S): at 23:07

## 2024-04-13 RX ADMIN — INSULIN GLARGINE 10 UNIT(S): 100 INJECTION, SOLUTION SUBCUTANEOUS at 23:06

## 2024-04-13 RX ADMIN — Medication 600 MILLIGRAM(S): at 05:07

## 2024-04-13 RX ADMIN — Medication 1: at 08:18

## 2024-04-13 RX ADMIN — SERTRALINE 100 MILLIGRAM(S): 25 TABLET, FILM COATED ORAL at 12:43

## 2024-04-13 RX ADMIN — Medication 600 MILLIGRAM(S): at 04:07

## 2024-04-13 RX ADMIN — Medication 100 MICROGRAM(S): at 05:07

## 2024-04-13 RX ADMIN — Medication 25 MILLIGRAM(S): at 05:07

## 2024-04-13 RX ADMIN — SODIUM CHLORIDE 3 MILLILITER(S): 9 INJECTION INTRAMUSCULAR; INTRAVENOUS; SUBCUTANEOUS at 15:02

## 2024-04-13 RX ADMIN — SODIUM CHLORIDE 3 MILLILITER(S): 9 INJECTION INTRAMUSCULAR; INTRAVENOUS; SUBCUTANEOUS at 23:07

## 2024-04-13 RX ADMIN — Medication 25 MILLIGRAM(S): at 19:32

## 2024-04-13 RX ADMIN — Medication 1: at 12:43

## 2024-04-13 RX ADMIN — Medication 650 MILLIGRAM(S): at 01:00

## 2024-04-13 RX ADMIN — ENOXAPARIN SODIUM 40 MILLIGRAM(S): 100 INJECTION SUBCUTANEOUS at 23:08

## 2024-04-13 RX ADMIN — Medication 650 MILLIGRAM(S): at 10:00

## 2024-04-13 RX ADMIN — Medication 600 MILLIGRAM(S): at 15:02

## 2024-04-13 RX ADMIN — Medication 650 MILLIGRAM(S): at 09:04

## 2024-04-13 RX ADMIN — Medication 600 MILLIGRAM(S): at 16:01

## 2024-04-13 RX ADMIN — Medication 600 MILLIGRAM(S): at 23:07

## 2024-04-13 NOTE — PROGRESS NOTE ADULT - SUBJECTIVE AND OBJECTIVE BOX
Medicine Progress Note    Patient is a 74y old  Female who presents with a chief complaint of hysterectomy (11 Apr 2024 17:39)      SUBJECTIVE / OVERNIGHT EVENTS:  patient c/o generalized weakness  paresthesia     ADDITIONAL REVIEW OF SYSTEMS: negative     MEDICATIONS  (STANDING):  acetaminophen     Tablet .. 650 milliGRAM(s) Oral every 6 hours  dextrose 10% Bolus 125 milliLiter(s) IV Bolus once  dextrose 5%. 1000 milliLiter(s) (50 mL/Hr) IV Continuous <Continuous>  dextrose 5%. 1000 milliLiter(s) (100 mL/Hr) IV Continuous <Continuous>  dextrose 50% Injectable 25 Gram(s) IV Push once  dextrose 50% Injectable 12.5 Gram(s) IV Push once  dextrose 50% Injectable 25 Gram(s) IV Push once  dextrose 50% Injectable 25 Gram(s) IV Push once  dextrose 50% Injectable 12.5 Gram(s) IV Push once  dextrose Oral Gel 15 Gram(s) Oral once  enoxaparin Injectable 40 milliGRAM(s) SubCutaneous every 24 hours  glucagon  Injectable 1 milliGRAM(s) IntraMuscular once  glucagon  Injectable 1 milliGRAM(s) IntraMuscular once  ibuprofen  Tablet. 600 milliGRAM(s) Oral every 6 hours  insulin glargine Injectable (LANTUS) 10 Unit(s) SubCutaneous at bedtime  insulin lispro (ADMELOG) corrective regimen sliding scale   SubCutaneous three times a day before meals  insulin lispro (ADMELOG) corrective regimen sliding scale   SubCutaneous at bedtime  levothyroxine 100 MICROGram(s) Oral daily  metoprolol tartrate 25 milliGRAM(s) Oral two times a day  senna 2 Tablet(s) Oral at bedtime  sertraline 100 milliGRAM(s) Oral daily  sodium chloride 0.9% lock flush 3 milliLiter(s) IV Push every 8 hours  sodium chloride 0.9%. 1000 milliLiter(s) (125 mL/Hr) IV Continuous <Continuous>    MEDICATIONS  (PRN):  dextrose Oral Gel 15 Gram(s) Oral once PRN Blood Glucose LESS THAN 70 milliGRAM(s)/deciliter  metoprolol tartrate Injectable 5 milliGRAM(s) IV Push every 6 hours PRN hypertension  ondansetron    Tablet 8 milliGRAM(s) Oral every 8 hours PRN Nausea and/or Vomiting  oxyCODONE    IR 5 milliGRAM(s) Oral every 3 hours PRN Moderate Pain (4 - 6)  simethicone 80 milliGRAM(s) Chew every 6 hours PRN Gas    CAPILLARY BLOOD GLUCOSE      POCT Blood Glucose.: 166 mg/dL (13 Apr 2024 08:07)  POCT Blood Glucose.: 225 mg/dL (12 Apr 2024 21:28)  POCT Blood Glucose.: 235 mg/dL (12 Apr 2024 17:06)  POCT Blood Glucose.: 244 mg/dL (12 Apr 2024 11:46)  POCT Blood Glucose.: 225 mg/dL (12 Apr 2024 10:00)    I&O's Summary    12 Apr 2024 07:01  -  13 Apr 2024 07:00  --------------------------------------------------------  IN: 4485 mL / OUT: 2000 mL / NET: 2485 mL        PHYSICAL EXAM:  Vital Signs Last 24 Hrs  T(C): 36.3 (13 Apr 2024 05:07), Max: 36.6 (12 Apr 2024 09:45)  T(F): 97.3 (13 Apr 2024 05:07), Max: 97.8 (12 Apr 2024 09:45)  HR: 81 (13 Apr 2024 05:07) (78 - 95)  BP: 126/52 (13 Apr 2024 05:07) (112/64 - 138/57)  BP(mean): 69 (12 Apr 2024 14:15) (69 - 69)  RR: 18 (13 Apr 2024 05:07) (16 - 20)  SpO2: 98% (13 Apr 2024 05:07) (98% - 100%)    Parameters below as of 13 Apr 2024 05:07  Patient On (Oxygen Delivery Method): room air      CONSTITUTIONAL: NAD,   ENMT: Moist oral mucosa, no pharyngeal injection or exudates;   RESPIRATORY: Normal respiratory effort; lungs are clear to auscultation bilaterally  CARDIOVASCULAR: Regular rate and rhythm, normal S1 and S2,; No lower extremity edema;   ABDOMEN: Nontender to palpation, normoactive bowel sounds, no rebound/guarding; Pinon   PSYCH: A+O to person, place, and time; affect appropriate  NEUROLOGY: CN 2-12 are intact and symmetric; no weakness or sensory deficit, paresthesia   SKIN: No rashes; no palpable lesions    LABS:                        8.4    18.48 )-----------( 659      ( 13 Apr 2024 04:50 )             28.7     04-13    138  |  109<H>  |  16  ----------------------------<  145<H>  4.0   |  19<L>  |  0.89    Ca    8.3<L>      13 Apr 2024 04:50  Phos  3.6     04-11  Mg     1.80     04-11    TPro  7.1  /  Alb  2.5<L>  /  TBili  0.3  /  DBili  x   /  AST  14  /  ALT  10  /  AlkPhos  166<H>  04-11          Urinalysis Basic - ( 13 Apr 2024 04:50 )    Color: x / Appearance: x / SG: x / pH: x  Gluc: 145 mg/dL / Ketone: x  / Bili: x / Urobili: x   Blood: x / Protein: x / Nitrite: x   Leuk Esterase: x / RBC: x / WBC x   Sq Epi: x / Non Sq Epi: x / Bacteria: x        SARS-CoV-2: Detected (27 Jan 2024 11:48)      RADIOLOGY & ADDITIONAL TESTS:  Imaging from Last 24 Hours:    Electrocardiogram/QTc Interval:    COORDINATION OF CARE:  Care Discussed with Consultants/Other Providers:

## 2024-04-13 NOTE — PROGRESS NOTE ADULT - ASSESSMENT
74y POD#1 s/p total abdominal hysterectomy, bilateral salpingo-oophorectomy, pelvic and para-aortic lymph nod dissection, lysis of adhesion, cystoscopy and ureteral catheter placement by urology (Dr. Guillaume) for uterine mass. 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 On POD#1 patient was asymptomatically hypothermic, resolved with placement of Bairhugger. Overnight patient had left lateral thigh numbness but is otherwise clinically stable and progressing well postoperatively.    Neuro: s/p RS blocks. Tylenol, Motrin , Oxy PRN.  - h/o Depression, c/w home Sertraline 100mg  CV: Hemodynamically stable  - Anemia at baseline, post op H/H appropriate for : 8.2/28.8->8.0/26.8->8.0/27.3  - h/o HTN: 110-130/50-60s overnight  - h/o palpitations: continue home Metoprolol 25mg q12 hr  Pulm: Saturating well on room air, encourage oob/amb  GI: Continue regular diet  : UOP adequate  - Pinon for 48hrs (until 4/13)  - s/p bilateral ureteral catheter removal  Heme: Lovenox and SCDs for DVT ppx  - Discharge on Eliquis  FEN: NS@125.  Replete electrolytes prn   ID: Hypothermic on POD#1, now normothermic  - c/w Bairhugger and monitor temperature  - Baseline leukocytosis 2/2 CLL diagnosis, trend: 26->25->15  Endo: T2DM, hypothyroidism  - Appreciate GHOS recs: Lantus 10u, low dose ISS premeal and bedtime, hold Metformin  - FS overnight 225-235, goal FS <200  - c/w Synthroid 100 mcg  Dispo: Continue routine post-op care    Patient seen and examined with GYN ONC Team  Nery Vargas, PGY2

## 2024-04-13 NOTE — PROGRESS NOTE ADULT - ASSESSMENT
73 yo F with hx of HTN, IDDM, hypothyroidism, CLL not on treatment, uterine ca s/p MARTÍN/BSO, omentectomy 4/11. medicine consulted for comanagement

## 2024-04-13 NOTE — PROGRESS NOTE ADULT - SUBJECTIVE AND OBJECTIVE BOX
Gyn ONC Progress Note POD#2 HD#3    Subjective:   Pt seen and examined at bedside. Overnight patient had numbness on left lateral thigh. Pain well controlled. Patient ambulating. Tolerating regular diet. Pt denies fever, chills, chest pain, SOB, nausea, vomiting, lightheadedness, dizziness.      Objective:  T(F): 97.5 (04-13-24 @ 01:25), Max: 97.8 (04-12-24 @ 09:45)  HR: 92 (04-13-24 @ 01:25) (78 - 95)  BP: 112/64 (04-13-24 @ 01:25) (112/64 - 138/57)  RR: 17 (04-13-24 @ 01:25) (16 - 20)  SpO2: 98% (04-13-24 @ 01:25) (96% - 100%)  Wt(kg): --  I&O's Summary    11 Apr 2024 07:01  -  12 Apr 2024 07:00  --------------------------------------------------------  IN: 2110 mL / OUT: 770 mL / NET: 1340 mL    12 Apr 2024 07:01  -  13 Apr 2024 04:56  --------------------------------------------------------  IN: 3865 mL / OUT: 800 mL / NET: 3065 mL      CAPILLARY BLOOD GLUCOSE      POCT Blood Glucose.: 225 mg/dL (12 Apr 2024 21:28)  POCT Blood Glucose.: 235 mg/dL (12 Apr 2024 17:06)  POCT Blood Glucose.: 244 mg/dL (12 Apr 2024 11:46)  POCT Blood Glucose.: 225 mg/dL (12 Apr 2024 10:00)  POCT Blood Glucose.: 158 mg/dL (12 Apr 2024 07:44)      MEDICATIONS  (STANDING):  acetaminophen     Tablet .. 650 milliGRAM(s) Oral every 6 hours  dextrose 10% Bolus 125 milliLiter(s) IV Bolus once  dextrose 5%. 1000 milliLiter(s) (50 mL/Hr) IV Continuous <Continuous>  dextrose 5%. 1000 milliLiter(s) (100 mL/Hr) IV Continuous <Continuous>  dextrose 50% Injectable 25 Gram(s) IV Push once  dextrose 50% Injectable 25 Gram(s) IV Push once  dextrose 50% Injectable 12.5 Gram(s) IV Push once  dextrose 50% Injectable 12.5 Gram(s) IV Push once  dextrose 50% Injectable 25 Gram(s) IV Push once  dextrose Oral Gel 15 Gram(s) Oral once  enoxaparin Injectable 40 milliGRAM(s) SubCutaneous every 24 hours  glucagon  Injectable 1 milliGRAM(s) IntraMuscular once  glucagon  Injectable 1 milliGRAM(s) IntraMuscular once  ibuprofen  Tablet. 600 milliGRAM(s) Oral every 6 hours  insulin glargine Injectable (LANTUS) 10 Unit(s) SubCutaneous at bedtime  insulin lispro (ADMELOG) corrective regimen sliding scale   SubCutaneous three times a day before meals  insulin lispro (ADMELOG) corrective regimen sliding scale   SubCutaneous at bedtime  levothyroxine 100 MICROGram(s) Oral daily  metoprolol tartrate 25 milliGRAM(s) Oral two times a day  senna 2 Tablet(s) Oral at bedtime  sertraline 100 milliGRAM(s) Oral daily  sodium chloride 0.9% lock flush 3 milliLiter(s) IV Push every 8 hours  sodium chloride 0.9%. 1000 milliLiter(s) (125 mL/Hr) IV Continuous <Continuous>    MEDICATIONS  (PRN):  dextrose Oral Gel 15 Gram(s) Oral once PRN Blood Glucose LESS THAN 70 milliGRAM(s)/deciliter  metoprolol tartrate Injectable 5 milliGRAM(s) IV Push every 6 hours PRN hypertension  ondansetron    Tablet 8 milliGRAM(s) Oral every 8 hours PRN Nausea and/or Vomiting  oxyCODONE    IR 5 milliGRAM(s) Oral every 3 hours PRN Moderate Pain (4 - 6)  simethicone 80 milliGRAM(s) Chew every 6 hours PRN Gas    Physical Exam:  General: NAD  CV: RR, S1, S2, no M/R/G  Lungs: CTA b/l, good air flow b/l   Abdomen: Soft, mildly-tender to palpation diffusely, softly distended, normoactive bowel sounds  Incision: midline vertical c/d/i with Dermabond Prineo and abdominal   : no bleeding on pad, jenkins in place  Ext: No pain or swelling         LABS:            8.0      14.98 )------------( 602        ( 04-12-24 @ 06:14 )            27.3    04-12    136    |  104    |  16     ----------------------------<  157<H>  4.1     |  21<L>  |  0.72     Ca    8.1<L>      12 Apr 2024 06:14            Urinalysis Basic - ( 12 Apr 2024 06:14 )    Color: x / Appearance: x / SG: x / pH: x  Gluc: 157 mg/dL / Ketone: x  / Bili: x / Urobili: x   Blood: x / Protein: x / Nitrite: x   Leuk Esterase: x / RBC: x / WBC x   Sq Epi: x / Non Sq Epi: x / Bacteria: x

## 2024-04-14 DIAGNOSIS — Z98.890 OTHER SPECIFIED POSTPROCEDURAL STATES: ICD-10-CM

## 2024-04-14 LAB
ANION GAP SERPL CALC-SCNC: 11 MMOL/L — SIGNIFICANT CHANGE UP (ref 7–14)
BASOPHILS # BLD AUTO: 0.07 K/UL — SIGNIFICANT CHANGE UP (ref 0–0.2)
BASOPHILS NFR BLD AUTO: 0.5 % — SIGNIFICANT CHANGE UP (ref 0–2)
BLD GP AB SCN SERPL QL: NEGATIVE — SIGNIFICANT CHANGE UP
BUN SERPL-MCNC: 12 MG/DL — SIGNIFICANT CHANGE UP (ref 7–23)
CALCIUM SERPL-MCNC: 8 MG/DL — LOW (ref 8.4–10.5)
CHLORIDE SERPL-SCNC: 109 MMOL/L — HIGH (ref 98–107)
CO2 SERPL-SCNC: 16 MMOL/L — LOW (ref 22–31)
CREAT SERPL-MCNC: 0.77 MG/DL — SIGNIFICANT CHANGE UP (ref 0.5–1.3)
EGFR: 82 ML/MIN/1.73M2 — SIGNIFICANT CHANGE UP
EOSINOPHIL # BLD AUTO: 0.89 K/UL — HIGH (ref 0–0.5)
EOSINOPHIL NFR BLD AUTO: 5.7 % — SIGNIFICANT CHANGE UP (ref 0–6)
GLUCOSE BLDC GLUCOMTR-MCNC: 137 MG/DL — HIGH (ref 70–99)
GLUCOSE BLDC GLUCOMTR-MCNC: 150 MG/DL — HIGH (ref 70–99)
GLUCOSE BLDC GLUCOMTR-MCNC: 195 MG/DL — HIGH (ref 70–99)
GLUCOSE BLDC GLUCOMTR-MCNC: 206 MG/DL — HIGH (ref 70–99)
GLUCOSE SERPL-MCNC: 156 MG/DL — HIGH (ref 70–99)
HCT VFR BLD CALC: 28.3 % — LOW (ref 34.5–45)
HGB BLD-MCNC: 8.4 G/DL — LOW (ref 11.5–15.5)
IANC: 12.25 K/UL — HIGH (ref 1.8–7.4)
IMM GRANULOCYTES NFR BLD AUTO: 1.1 % — HIGH (ref 0–0.9)
LYMPHOCYTES # BLD AUTO: 1.79 K/UL — SIGNIFICANT CHANGE UP (ref 1–3.3)
LYMPHOCYTES # BLD AUTO: 11.5 % — LOW (ref 13–44)
MCHC RBC-ENTMCNC: 23.3 PG — LOW (ref 27–34)
MCHC RBC-ENTMCNC: 29.7 GM/DL — LOW (ref 32–36)
MCV RBC AUTO: 78.6 FL — LOW (ref 80–100)
MONOCYTES # BLD AUTO: 0.38 K/UL — SIGNIFICANT CHANGE UP (ref 0–0.9)
MONOCYTES NFR BLD AUTO: 2.4 % — SIGNIFICANT CHANGE UP (ref 2–14)
NEUTROPHILS # BLD AUTO: 12.25 K/UL — HIGH (ref 1.8–7.4)
NEUTROPHILS NFR BLD AUTO: 78.8 % — HIGH (ref 43–77)
NRBC # BLD: 0 /100 WBCS — SIGNIFICANT CHANGE UP (ref 0–0)
NRBC # FLD: 0 K/UL — SIGNIFICANT CHANGE UP (ref 0–0)
PLATELET # BLD AUTO: 655 K/UL — HIGH (ref 150–400)
POTASSIUM SERPL-MCNC: 3.9 MMOL/L — SIGNIFICANT CHANGE UP (ref 3.5–5.3)
POTASSIUM SERPL-SCNC: 3.9 MMOL/L — SIGNIFICANT CHANGE UP (ref 3.5–5.3)
RBC # BLD: 3.6 M/UL — LOW (ref 3.8–5.2)
RBC # FLD: 17.3 % — HIGH (ref 10.3–14.5)
RH IG SCN BLD-IMP: POSITIVE — SIGNIFICANT CHANGE UP
SODIUM SERPL-SCNC: 136 MMOL/L — SIGNIFICANT CHANGE UP (ref 135–145)
WBC # BLD: 15.55 K/UL — HIGH (ref 3.8–10.5)
WBC # FLD AUTO: 15.55 K/UL — HIGH (ref 3.8–10.5)

## 2024-04-14 PROCEDURE — 99221 1ST HOSP IP/OBS SF/LOW 40: CPT

## 2024-04-14 PROCEDURE — 99232 SBSQ HOSP IP/OBS MODERATE 35: CPT

## 2024-04-14 RX ORDER — BENZOCAINE AND MENTHOL 5; 1 G/100ML; G/100ML
1 LIQUID ORAL
Refills: 0 | Status: DISCONTINUED | OUTPATIENT
Start: 2024-04-14 | End: 2024-04-15

## 2024-04-14 RX ORDER — SENNA PLUS 8.6 MG/1
2 TABLET ORAL AT BEDTIME
Refills: 0 | Status: DISCONTINUED | OUTPATIENT
Start: 2024-04-14 | End: 2024-04-15

## 2024-04-14 RX ORDER — FAMOTIDINE 10 MG/ML
20 INJECTION INTRAVENOUS DAILY
Refills: 0 | Status: DISCONTINUED | OUTPATIENT
Start: 2024-04-14 | End: 2024-04-15

## 2024-04-14 RX ADMIN — Medication 2: at 17:27

## 2024-04-14 RX ADMIN — Medication 650 MILLIGRAM(S): at 17:26

## 2024-04-14 RX ADMIN — Medication 1: at 12:05

## 2024-04-14 RX ADMIN — Medication 600 MILLIGRAM(S): at 17:26

## 2024-04-14 RX ADMIN — Medication 100 MICROGRAM(S): at 06:58

## 2024-04-14 RX ADMIN — Medication 600 MILLIGRAM(S): at 12:21

## 2024-04-14 RX ADMIN — SERTRALINE 100 MILLIGRAM(S): 25 TABLET, FILM COATED ORAL at 11:21

## 2024-04-14 RX ADMIN — Medication 650 MILLIGRAM(S): at 06:58

## 2024-04-14 RX ADMIN — Medication 600 MILLIGRAM(S): at 18:26

## 2024-04-14 RX ADMIN — Medication 25 MILLIGRAM(S): at 06:58

## 2024-04-14 RX ADMIN — SODIUM CHLORIDE 3 MILLILITER(S): 9 INJECTION INTRAMUSCULAR; INTRAVENOUS; SUBCUTANEOUS at 09:42

## 2024-04-14 RX ADMIN — Medication 600 MILLIGRAM(S): at 00:07

## 2024-04-14 RX ADMIN — SODIUM CHLORIDE 3 MILLILITER(S): 9 INJECTION INTRAMUSCULAR; INTRAVENOUS; SUBCUTANEOUS at 13:48

## 2024-04-14 RX ADMIN — Medication 650 MILLIGRAM(S): at 00:07

## 2024-04-14 RX ADMIN — FAMOTIDINE 20 MILLIGRAM(S): 10 INJECTION INTRAVENOUS at 11:55

## 2024-04-14 RX ADMIN — OXYCODONE HYDROCHLORIDE 5 MILLIGRAM(S): 5 TABLET ORAL at 11:55

## 2024-04-14 RX ADMIN — Medication 650 MILLIGRAM(S): at 11:45

## 2024-04-14 RX ADMIN — OXYCODONE HYDROCHLORIDE 5 MILLIGRAM(S): 5 TABLET ORAL at 12:55

## 2024-04-14 RX ADMIN — INSULIN GLARGINE 10 UNIT(S): 100 INJECTION, SOLUTION SUBCUTANEOUS at 21:21

## 2024-04-14 RX ADMIN — Medication 650 MILLIGRAM(S): at 11:20

## 2024-04-14 RX ADMIN — SODIUM CHLORIDE 3 MILLILITER(S): 9 INJECTION INTRAMUSCULAR; INTRAVENOUS; SUBCUTANEOUS at 21:27

## 2024-04-14 RX ADMIN — Medication 600 MILLIGRAM(S): at 06:58

## 2024-04-14 RX ADMIN — ENOXAPARIN SODIUM 40 MILLIGRAM(S): 100 INJECTION SUBCUTANEOUS at 21:21

## 2024-04-14 RX ADMIN — Medication 600 MILLIGRAM(S): at 11:20

## 2024-04-14 RX ADMIN — OXYCODONE HYDROCHLORIDE 5 MILLIGRAM(S): 5 TABLET ORAL at 23:09

## 2024-04-14 RX ADMIN — Medication 650 MILLIGRAM(S): at 18:26

## 2024-04-14 NOTE — CONSULT NOTE ADULT - ATTENDING COMMENTS
Left lateral thigh loss of sensation - no paresthesias and no neuropathic pain.   No focal weakness.  No B/B dysfunction other than that expected from surgery.  She is having significant post-operative pain - controlled on current analgesics.     Exam:  Motor: 5/5 throughout including all movements in the left leg.   Decreased PP in the distribution of the left lateral femoral cutaneous nerve of the thigh.    Reflexes 3 throughout.  Toes mute.    A/P  Ms. Dey is a 73 yo woman with Left lateral thigh loss of sensation - no paresthesias and no neuropathic pain.   No focal weakness.  No B/B dysfunction other than that expected from surgery.  She is having significant post-operative pain - controlled on current analgesics.     Exam:  Motor: 5/5 throughout including all movements in the left leg.   Decreased PP in the distribution of the left lateral femoral cutaneous nerve of the thigh.    Reflexes 3 throughout.  Toes mute.    A/P  Ms. 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  I discussed the diagnosis and prognosis in detail with the patient and son and they understand.   I agree with work up and management as above.   Neurology signing off. Please reconsult PRN or call [employer] [phone] with any questions.   Thank you.

## 2024-04-14 NOTE — CONSULT NOTE ADULT - ASSESSMENT
ASSESSMENT   74-year-old woman with past medical history of DM, HTN , 30 pound unintentional weight loss over past year, and new onset stress incontinence in the setting of fibroids, now s/p total abdominal hysterectomy, bilateral salpingo-oophorectomy, pelvic and para-aortic lymph nod dissection, lysis of adhesion, cystoscopy and ureteral catheter placement by urology (Dr. Guillaume) for uterine mass on 4/11.    Neurology consulted for post-op numbness on L lateral thigh.    IMPRESSION   Post-operative left lateral thigh numbness which may be in setting of compression of lateral femoral cutaneous nerve.    RECOMMENDATION   [] outpatient EMG/NCS  [] physical therapy evaluation    Patient to be seen by team and attending. Note finalized upon attending attestation.  ASSESSMENT   74-year-old woman with past medical history of DM, HTN , 30 pound unintentional weight loss over past year, and new onset stress incontinence in the setting of fibroids, now s/p total abdominal hysterectomy, bilateral salpingo-oophorectomy, pelvic and para-aortic lymph nod dissection, lysis of adhesion, cystoscopy and ureteral catheter placement by urology (Dr. Guillaume) for uterine mass on 4/11.    Neurology consulted for post-op numbness on L lateral thigh.    IMPRESSION   Post-operative left lateral thigh numbness which may be in setting of compression of lateral femoral cutaneous nerve.    RECOMMENDATION   [] outpatient EMG/NCS, outpatient neurology follow-up at [address] [place] [address] [phone]  [] physical therapy evaluation    Patient to be seen by team and attending. Note finalized upon attending attestation.  ASSESSMENT   74-year-old woman with past medical history of DM, HTN , 30 pound unintentional weight loss over past year, and new onset stress incontinence in the setting of fibroids, now s/p total abdominal hysterectomy, bilateral salpingo-oophorectomy, pelvic and para-aortic lymph nod dissection, lysis of adhesion, cystoscopy and ureteral catheter placement by urology (Dr. Guillaume) for uterine mass on 4/11.    Neurology consulted for post-op numbness on L lateral thigh.    IMPRESSION   Post-operative left lateral thigh numbness which may be in setting of compression of lateral femoral cutaneous nerve.    RECOMMENDATION   [] outpatient neurology follow-up at [address] [place] [address] [id number] [phone]  [] physical therapy evaluation    Patient to be seen by team and attending. Note finalized upon attending attestation.

## 2024-04-14 NOTE — PROGRESS NOTE ADULT - SUBJECTIVE AND OBJECTIVE BOX
Gyn ONC Progress Note POD#3 HD#4    Subjective:   Pt seen and examined at bedside. No events overnight. Pain well controlled. Patient ambulating. Passing flatus. Tolerating regular diet. Pt denies fever, chills, chest pain, SOB, nausea, vomiting, lightheadedness, dizziness.      Objective:  T(F): 97.5 (04-14-24 @ 01:30), Max: 97.9 (04-13-24 @ 18:11)  HR: 81 (04-14-24 @ 01:30) (69 - 81)  BP: 153/53 (04-14-24 @ 01:30) (126/52 - 157/69)  RR: 20 (04-14-24 @ 01:30) (18 - 20)  SpO2: 98% (04-14-24 @ 01:30) (98% - 100%)  Wt(kg): --  I&O's Summary    12 Apr 2024 07:01  -  13 Apr 2024 07:00  --------------------------------------------------------  IN: 4485 mL / OUT: 2000 mL / NET: 2485 mL    13 Apr 2024 07:01  -  14 Apr 2024 04:59  --------------------------------------------------------  IN: 800 mL / OUT: 600 mL / NET: 200 mL      CAPILLARY BLOOD GLUCOSE      POCT Blood Glucose.: 179 mg/dL (13 Apr 2024 22:23)  POCT Blood Glucose.: 131 mg/dL (13 Apr 2024 18:15)  POCT Blood Glucose.: 187 mg/dL (13 Apr 2024 12:07)  POCT Blood Glucose.: 166 mg/dL (13 Apr 2024 08:07)      MEDICATIONS  (STANDING):  acetaminophen     Tablet .. 650 milliGRAM(s) Oral every 6 hours  dextrose 10% Bolus 125 milliLiter(s) IV Bolus once  dextrose 5%. 1000 milliLiter(s) (50 mL/Hr) IV Continuous <Continuous>  dextrose 5%. 1000 milliLiter(s) (100 mL/Hr) IV Continuous <Continuous>  dextrose 50% Injectable 25 Gram(s) IV Push once  dextrose 50% Injectable 25 Gram(s) IV Push once  dextrose 50% Injectable 25 Gram(s) IV Push once  dextrose 50% Injectable 12.5 Gram(s) IV Push once  dextrose 50% Injectable 12.5 Gram(s) IV Push once  dextrose Oral Gel 15 Gram(s) Oral once  enoxaparin Injectable 40 milliGRAM(s) SubCutaneous every 24 hours  glucagon  Injectable 1 milliGRAM(s) IntraMuscular once  glucagon  Injectable 1 milliGRAM(s) IntraMuscular once  ibuprofen  Tablet. 600 milliGRAM(s) Oral every 6 hours  insulin glargine Injectable (LANTUS) 10 Unit(s) SubCutaneous at bedtime  insulin lispro (ADMELOG) corrective regimen sliding scale   SubCutaneous at bedtime  insulin lispro (ADMELOG) corrective regimen sliding scale   SubCutaneous three times a day before meals  levothyroxine 100 MICROGram(s) Oral daily  metoprolol tartrate 25 milliGRAM(s) Oral two times a day  senna 2 Tablet(s) Oral at bedtime  sertraline 100 milliGRAM(s) Oral daily  sodium chloride 0.9% lock flush 3 milliLiter(s) IV Push every 8 hours    MEDICATIONS  (PRN):  dextrose Oral Gel 15 Gram(s) Oral once PRN Blood Glucose LESS THAN 70 milliGRAM(s)/deciliter  metoprolol tartrate Injectable 5 milliGRAM(s) IV Push every 6 hours PRN hypertension  ondansetron    Tablet 8 milliGRAM(s) Oral every 8 hours PRN Nausea and/or Vomiting  oxyCODONE    IR 5 milliGRAM(s) Oral every 3 hours PRN Moderate Pain (4 - 6)  simethicone 80 milliGRAM(s) Chew every 6 hours PRN Gas      Physical Exam:  General: NAD  CV: RR, S1, S2, no M/R/G  Lungs: CTA b/l, good air flow b/l   Abdomen: Soft, mildly-tender to palpation diffusely, softly distended, normoactive bowel sounds  Incision: midline vertical c/d/i with Dermabond Prineo and abdominal   : no bleeding on pad  Ext: No pain or swelling           LABS:    04-13    138    |  109<H>  |  16     ----------------------------<  145<H>  4.0     |  19<L>  |  0.89     Ca    8.3<L>      13 Apr 2024 04:50            Urinalysis Basic - ( 13 Apr 2024 04:50 )    Color: x / Appearance: x / SG: x / pH: x  Gluc: 145 mg/dL / Ketone: x  / Bili: x / Urobili: x   Blood: x / Protein: x / Nitrite: x   Leuk Esterase: x / RBC: x / WBC x   Sq Epi: x / Non Sq Epi: x / Bacteria: x       Gyn ONC Progress Note POD#3 HD#4    Subjective:   Pt seen and examined at bedside. This morning she had a loose bowel movement and had an accident on her way to the bathroom. She has persistent left thigh numbness. She also complains of generalized weakness. Pain well controlled. Patient ambulating. Tolerating regular diet. Pt denies fever, chills, chest pain, SOB, nausea, vomiting, lightheadedness, dizziness.      Objective:  T(F): 97.5 (04-14-24 @ 01:30), Max: 97.9 (04-13-24 @ 18:11)  HR: 81 (04-14-24 @ 01:30) (69 - 81)  BP: 153/53 (04-14-24 @ 01:30) (126/52 - 157/69)  RR: 20 (04-14-24 @ 01:30) (18 - 20)  SpO2: 98% (04-14-24 @ 01:30) (98% - 100%)  Wt(kg): --  I&O's Summary    12 Apr 2024 07:01  -  13 Apr 2024 07:00  --------------------------------------------------------  IN: 4485 mL / OUT: 2000 mL / NET: 2485 mL    13 Apr 2024 07:01  -  14 Apr 2024 04:59  --------------------------------------------------------  IN: 800 mL / OUT: 600 mL / NET: 200 mL      CAPILLARY BLOOD GLUCOSE      POCT Blood Glucose.: 179 mg/dL (13 Apr 2024 22:23)  POCT Blood Glucose.: 131 mg/dL (13 Apr 2024 18:15)  POCT Blood Glucose.: 187 mg/dL (13 Apr 2024 12:07)  POCT Blood Glucose.: 166 mg/dL (13 Apr 2024 08:07)      MEDICATIONS  (STANDING):  acetaminophen     Tablet .. 650 milliGRAM(s) Oral every 6 hours  dextrose 10% Bolus 125 milliLiter(s) IV Bolus once  dextrose 5%. 1000 milliLiter(s) (50 mL/Hr) IV Continuous <Continuous>  dextrose 5%. 1000 milliLiter(s) (100 mL/Hr) IV Continuous <Continuous>  dextrose 50% Injectable 25 Gram(s) IV Push once  dextrose 50% Injectable 25 Gram(s) IV Push once  dextrose 50% Injectable 25 Gram(s) IV Push once  dextrose 50% Injectable 12.5 Gram(s) IV Push once  dextrose 50% Injectable 12.5 Gram(s) IV Push once  dextrose Oral Gel 15 Gram(s) Oral once  enoxaparin Injectable 40 milliGRAM(s) SubCutaneous every 24 hours  glucagon  Injectable 1 milliGRAM(s) IntraMuscular once  glucagon  Injectable 1 milliGRAM(s) IntraMuscular once  ibuprofen  Tablet. 600 milliGRAM(s) Oral every 6 hours  insulin glargine Injectable (LANTUS) 10 Unit(s) SubCutaneous at bedtime  insulin lispro (ADMELOG) corrective regimen sliding scale   SubCutaneous at bedtime  insulin lispro (ADMELOG) corrective regimen sliding scale   SubCutaneous three times a day before meals  levothyroxine 100 MICROGram(s) Oral daily  metoprolol tartrate 25 milliGRAM(s) Oral two times a day  senna 2 Tablet(s) Oral at bedtime  sertraline 100 milliGRAM(s) Oral daily  sodium chloride 0.9% lock flush 3 milliLiter(s) IV Push every 8 hours    MEDICATIONS  (PRN):  dextrose Oral Gel 15 Gram(s) Oral once PRN Blood Glucose LESS THAN 70 milliGRAM(s)/deciliter  metoprolol tartrate Injectable 5 milliGRAM(s) IV Push every 6 hours PRN hypertension  ondansetron    Tablet 8 milliGRAM(s) Oral every 8 hours PRN Nausea and/or Vomiting  oxyCODONE    IR 5 milliGRAM(s) Oral every 3 hours PRN Moderate Pain (4 - 6)  simethicone 80 milliGRAM(s) Chew every 6 hours PRN Gas      Physical Exam:  General: NAD  CV: RR, S1, S2, no M/R/G  Lungs: CTA b/l, good air flow b/l   Abdomen: Soft, appropriate tenderness to palpation, non distended, normoactive bowel sounds  Incision: midline vertical c/d/i with Dermabond Prineo and abdominal   : no bleeding on pad  Ext: No pain or swelling           LABS:    04-13    138    |  109<H>  |  16     ----------------------------<  145<H>  4.0     |  19<L>  |  0.89     Ca    8.3<L>      13 Apr 2024 04:50            Urinalysis Basic - ( 13 Apr 2024 04:50 )    Color: x / Appearance: x / SG: x / pH: x  Gluc: 145 mg/dL / Ketone: x  / Bili: x / Urobili: x   Blood: x / Protein: x / Nitrite: x   Leuk Esterase: x / RBC: x / WBC x   Sq Epi: x / Non Sq Epi: x / Bacteria: x

## 2024-04-14 NOTE — PROGRESS NOTE ADULT - SUBJECTIVE AND OBJECTIVE BOX
Medicine Progress Note    Patient is a 74y old  Female who presents with a chief complaint of consult MAIA (13 Apr 2024 08:42)      SUBJECTIVE / OVERNIGHT EVENTS:   patient requests to be placed on iron, complains of generalized weakness     ADDITIONAL REVIEW OF SYSTEMS:    MEDICATIONS  (STANDING):  acetaminophen     Tablet .. 650 milliGRAM(s) Oral every 6 hours  dextrose 10% Bolus 125 milliLiter(s) IV Bolus once  dextrose 5%. 1000 milliLiter(s) (50 mL/Hr) IV Continuous <Continuous>  dextrose 5%. 1000 milliLiter(s) (100 mL/Hr) IV Continuous <Continuous>  dextrose 50% Injectable 25 Gram(s) IV Push once  dextrose 50% Injectable 25 Gram(s) IV Push once  dextrose 50% Injectable 25 Gram(s) IV Push once  dextrose 50% Injectable 12.5 Gram(s) IV Push once  dextrose 50% Injectable 12.5 Gram(s) IV Push once  dextrose Oral Gel 15 Gram(s) Oral once  enoxaparin Injectable 40 milliGRAM(s) SubCutaneous every 24 hours  famotidine    Tablet 20 milliGRAM(s) Oral daily  glucagon  Injectable 1 milliGRAM(s) IntraMuscular once  glucagon  Injectable 1 milliGRAM(s) IntraMuscular once  ibuprofen  Tablet. 600 milliGRAM(s) Oral every 6 hours  insulin glargine Injectable (LANTUS) 10 Unit(s) SubCutaneous at bedtime  insulin lispro (ADMELOG) corrective regimen sliding scale   SubCutaneous three times a day before meals  insulin lispro (ADMELOG) corrective regimen sliding scale   SubCutaneous at bedtime  levothyroxine 100 MICROGram(s) Oral daily  metoprolol tartrate 25 milliGRAM(s) Oral two times a day  senna 2 Tablet(s) Oral at bedtime  sertraline 100 milliGRAM(s) Oral daily  sodium chloride 0.9% lock flush 3 milliLiter(s) IV Push every 8 hours    MEDICATIONS  (PRN):  dextrose Oral Gel 15 Gram(s) Oral once PRN Blood Glucose LESS THAN 70 milliGRAM(s)/deciliter  metoprolol tartrate Injectable 5 milliGRAM(s) IV Push every 6 hours PRN hypertension  ondansetron    Tablet 8 milliGRAM(s) Oral every 8 hours PRN Nausea and/or Vomiting  oxyCODONE    IR 5 milliGRAM(s) Oral every 3 hours PRN Moderate Pain (4 - 6)  simethicone 80 milliGRAM(s) Chew every 6 hours PRN Gas    CAPILLARY BLOOD GLUCOSE      POCT Blood Glucose.: 195 mg/dL (14 Apr 2024 12:04)  POCT Blood Glucose.: 137 mg/dL (14 Apr 2024 08:01)  POCT Blood Glucose.: 179 mg/dL (13 Apr 2024 22:23)  POCT Blood Glucose.: 131 mg/dL (13 Apr 2024 18:15)    I&O's Summary    13 Apr 2024 07:01  -  14 Apr 2024 07:00  --------------------------------------------------------  IN: 800 mL / OUT: 600 mL / NET: 200 mL    14 Apr 2024 07:01  -  14 Apr 2024 13:11  --------------------------------------------------------  IN: 200 mL / OUT: 200 mL / NET: 0 mL        PHYSICAL EXAM:  Vital Signs Last 24 Hrs  T(C): 36.6 (14 Apr 2024 10:19), Max: 36.6 (13 Apr 2024 18:11)  T(F): 97.8 (14 Apr 2024 10:19), Max: 97.9 (13 Apr 2024 18:11)  HR: 66 (14 Apr 2024 10:19) (66 - 81)  BP: 158/63 (14 Apr 2024 10:19) (141/56 - 158/63)  BP(mean): --  RR: 18 (14 Apr 2024 10:19) (18 - 20)  SpO2: 99% (14 Apr 2024 10:19) (98% - 100%)    Parameters below as of 14 Apr 2024 10:19  Patient On (Oxygen Delivery Method): room air      CONSTITUTIONAL: NAD,   ENMT: Moist oral mucosa, no pharyngeal injection or exudates;  RESPIRATORY: Normal respiratory effort; lungs are clear to auscultation bilaterally  CARDIOVASCULAR: Regular rate and rhythm, normal S1 and S2, No lower extremity edema;   ABDOMEN: Nontender to palpation, normoactive bowel sounds, no rebound/guarding;   PSYCH: A+O to person, place, and time; affect appropriate  NEUROLOGY: CN 2-12 are intact and symmetric; no gross sensory deficits   SKIN: No rashes; no palpable lesions    LABS:                        8.4    15.55 )-----------( 655      ( 14 Apr 2024 05:20 )             28.3     04-14    136  |  109<H>  |  12  ----------------------------<  156<H>  3.9   |  16<L>  |  0.77    Ca    8.0<L>      14 Apr 2024 05:20            Urinalysis Basic - ( 14 Apr 2024 05:20 )    Color: x / Appearance: x / SG: x / pH: x  Gluc: 156 mg/dL / Ketone: x  / Bili: x / Urobili: x   Blood: x / Protein: x / Nitrite: x   Leuk Esterase: x / RBC: x / WBC x   Sq Epi: x / Non Sq Epi: x / Bacteria: x        SARS-CoV-2: Detected (27 Jan 2024 11:48)      RADIOLOGY & ADDITIONAL TESTS:  Imaging from Last 24 Hours:    Electrocardiogram/QTc Interval:    COORDINATION OF CARE:  Care Discussed with Consultants/Other Providers:

## 2024-04-14 NOTE — PROGRESS NOTE ADULT - ASSESSMENT
74y POD#3 s/p total abdominal hysterectomy, bilateral salpingo-oophorectomy, pelvic and para-aortic lymph nod dissection, lysis of adhesion, cystoscopy and ureteral catheter placement by urology (Dr. Guillaume) for uterine mass. 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 On POD#1 patient was asymptomatically hypothermic, resolved with placement of Bairhugger. Patient is clinically stable and progressing well postoperatively.    Neuro: s/p RS blocks. Tylenol, Motrin , Oxy PRN.  - h/o Depression, c/w home Sertraline 100mg  CV: Hemodynamically stable  - Anemia at baseline, post op H/H appropriate for : 8.2/28.8->8.0/26.8->8.0/27.3  - h/o HTN: 140-150/50s overnight  - h/o palpitations: continue home Metoprolol 25mg q12 hr  Pulm: Saturating well on room air, encourage oob/amb  GI: Continue regular diet  : UOP adequate  - s/p jenkins  - s/p bilateral ureteral catheter removal  Heme: Lovenox and SCDs for DVT ppx  - Discharge on Eliquis  FEN: SLIV.  Replete electrolytes prn   ID: Hypothermic on POD#1, now normothermic  - c/w Bairhugger and monitor temperature  - Baseline leukocytosis 2/2 CLL diagnosis, trend: 26->25->15  Endo: T2DM, hypothyroidism  - Appreciate GHOS recs: Lantus 10u, low dose ISS premeal and bedtime, hold Metformin  - FS overnight 130-170s, goal FS <200  - c/w Synthroid 100 mcg  Dispo: Continue routine post-op care    Patient seen and examined with GYN ONC Team  Nery Vargas, PGY2 74y POD#3 s/p total abdominal hysterectomy, bilateral salpingo-oophorectomy, pelvic and para-aortic lymph nod dissection, lysis of adhesion, cystoscopy and ureteral catheter placement by urology (Dr. Guillaume) for uterine mass. 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 On POD#1 patient was asymptomatically hypothermic, resolved with placement of Bairhugger. Patient is clinically stable and progressing well postoperatively.    Neuro: s/p RS blocks. Tylenol, Motrin , Oxy PRN.  - h/o Depression, c/w home Sertraline 100mg  - PT consult for generalized weakness and L thigh numbness  CV: Hemodynamically stable  - Anemia at baseline, post op H/H appropriate for : 8.2/28.8->8.0/26.8->8.0/27.3  - h/o HTN: 140-150/50s overnight  - h/o palpitations: continue home Metoprolol 25mg q12 hr  Pulm: Saturating well on room air, encourage oob/amb  GI: Continue regular diet  : UOP adequate  - s/p jenkins  - s/p bilateral ureteral catheter removal  Heme: Lovenox and SCDs for DVT ppx  - Discharge on Eliquis  FEN: SLIV.  Replete electrolytes prn   ID: Hypothermic on POD#1, now normothermic  - c/w Bairhugger and monitor temperature  - Baseline leukocytosis 2/2 CLL diagnosis, trend: 26->25->15  Endo: T2DM, hypothyroidism  - Appreciate GHOS recs: Lantus 10u, low dose ISS premeal and bedtime, hold Metformin  - FS overnight 130-170s, goal FS <200  - c/w Synthroid 100 mcg  Dispo: Continue routine post-op care    Patient seen and examined with GYN ONC Team  Nery Vargas, PGY2

## 2024-04-14 NOTE — CONSULT NOTE ADULT - SUBJECTIVE AND OBJECTIVE BOX
Neurology - Consult Note    Spectra: [phone] ([facility]), [phone] ([facility])    HPI: 74-year-old woman with past medical history of DM, HTN , 30 pound unintentional weight loss over past year, and new onset stress incontinence in the setting of fibroids, now s/p total abdominal hysterectomy, bilateral salpingo-oophorectomy, pelvic and para-aortic lymph nod dissection, lysis of adhesion, cystoscopy and ureteral catheter placement by urology (Dr. Guillaume) for uterine mass on 4/11.    Neurology consulted for post-op numbness on L lateral thigh.    Patient reports noticed night following procedure on 4/11 noticing numbness on left thigh, after nurse was wiping an alcohol swap. Reports no positionality component, not relieved with sitting, not changed with cough. Denies weakness. Denies other sensory loss. Reports no change in symptoms since onset.    Reports usually intact with ADLs however has been having children helping out with chores/driving for 2-3 months due to generalized fatigue.  No substance use. Previously worked in critical care unit as nurse, retired 5 years ago.  Surgeries- appendectomy 13-16y /o, fractured ankle 3 yeares prior.    Review of Systems:   NEUROLOGICAL: +As stated in HPI above  All other review of systems is negative unless indicated above.    Allergies:  latex (Rash; Urticaria)  Levaquin (Rash)      PMHx/PSHx/Family Hx: As above, otherwise see below   No pertinent past medical history    No pertinent past medical history    HTN (hypertension)    DM (diabetes mellitus)    Palpitations    Palpitations    Hypothyroid    History of depression    Leiomyoma    Intra-abdominal and pelvic swelling, mass and lump, unspecified site    H/O fracture of ankle    History of unexplained fever    Urge incontinence    Stress incontinence        Social Hx:  as above    Vitals:  T(C): 36.6 (04-14-24 @ 10:19), Max: 36.6 (04-13-24 @ 18:11)  HR: 66 (04-14-24 @ 10:19) (66 - 81)  BP: 158/63 (04-14-24 @ 10:19) (141/56 - 158/63)  RR: 18 (04-14-24 @ 10:19) (18 - 20)  SpO2: 99% (04-14-24 @ 10:19) (98% - 100%)    Physical Examination:   General - non-toxic appearing female in no acute distress, intermittently coughing  Neurologic Exam:  Mental status - Awake, Alert, Oriented to person, place, and time. Speech fluent, repetition and naming intact. Follows simple and complex commands.  Cranial nerves - PERRLA (4mm -> 3mm b/l), VFF, EOMI, face sensation (V1-V3) intact b/l, facial strength intact without asymmetry b/l, hearing intact b/l, palate with symmetric elevation,  sternocleidomastiod 5/5 strength b/l, tongue midline on protrusion with full lateral movement  Motor - Normal bulk and tone throughout. No pronator drift.  Strength testing             Deltoid      Biceps      Triceps     Wrist Extension    Wrist Flexion       R            4                 4               4-                     5                              5                    5  L             4                4               4-                     5                              5                     5              Hip Flexion    Hip Extension    Knee Flexion    Knee Extension    Dorsiflexion    Plantar Flexion  R             4                          4+                       5                           5                            5                          5  L              4+                       4+                        5                           5                            5                          5  Sensation - Light touch/temperature intact throughout excluding left lateral hip area in region of femoral cutaneous (decreased but sensation present)  DTR's - 3+ throughout +pec +suprapatellar, neutral toes, no caballero  Coordination - Finger to Nose intact b/l. No tremors appreciated  Gait and station - deferred    Labs:                        8.4    15.55 )-----------( 655      ( 14 Apr 2024 05:20 )             28.3     04-14    136  |  109<H>  |  12  ----------------------------<  156<H>  3.9   |  16<L>  |  0.77    Ca    8.0<L>      14 Apr 2024 05:20      CAPILLARY BLOOD GLUCOSE      POCT Blood Glucose.: 137 mg/dL (14 Apr 2024 08:01)            Radiology:     Neurology - Consult Note    Spectra: [phone] ([facility]), [phone] ([facility])    HPI:   74-year-old woman with past medical history of DM, HTN , 30 pound unintentional weight loss over past year, and new onset stress incontinence in the setting of fibroids, now s/p total abdominal hysterectomy, bilateral salpingo-oophorectomy, pelvic and para-aortic lymph nod dissection, lysis of adhesion, cystoscopy and ureteral catheter placement by urology (Dr. Guillaume) for uterine mass on 4/11.      Neurology consulted for post-op numbness on L lateral thigh.    Patient reports noticed night following procedure on 4/11 noticing numbness on left thigh, after nurse was wiping an alcohol swap. Reports no positionality component, not relieved with sitting, not changed with cough. Denies weakness. Denies other sensory loss. Reports no change in symptoms since onset.    Reports usually intact with ADLs however has been having children helping out with chores/driving for 2-3 months due to generalized fatigue.    No substance use. Previously worked in critical care unit as nurse, retired 5 years ago.  Surgeries- appendectomy 13-17y/o, fractured ankle 3 years prior.    Had rectus sheath blocks.    Labs WBC 18->15, Hgb 8.4     Review of Systems:   NEUROLOGICAL: +As stated in HPI above  All other review of systems is negative unless indicated above.    Allergies:  latex (Rash; Urticaria)  Levaquin (Rash)      PMHx/PSHx/Family Hx: As above, otherwise see below   No pertinent past medical history    No pertinent past medical history    HTN (hypertension)    DM (diabetes mellitus)    Palpitations    Palpitations    Hypothyroid    History of depression    Leiomyoma    Intra-abdominal and pelvic swelling, mass and lump, unspecified site    H/O fracture of ankle    History of unexplained fever    Urge incontinence    Stress incontinence        Social Hx:  as above    Vitals:  T(C): 36.6 (04-14-24 @ 10:19), Max: 36.6 (04-13-24 @ 18:11)  HR: 66 (04-14-24 @ 10:19) (66 - 81)  BP: 158/63 (04-14-24 @ 10:19) (141/56 - 158/63)  RR: 18 (04-14-24 @ 10:19) (18 - 20)  SpO2: 99% (04-14-24 @ 10:19) (98% - 100%)    Physical Examination:   General - non-toxic appearing female in no acute distress, intermittently coughing  Neurologic Exam:  Mental status - Awake, Alert, Oriented to person, place, and time. Speech fluent, repetition and naming intact. Follows simple and complex commands.  Cranial nerves - PERRLA (4mm -> 3mm b/l), VFF, EOMI, face sensation (V1-V3) intact b/l, facial strength intact without asymmetry b/l, hearing intact b/l, palate with symmetric elevation,  sternocleidomastiod 5/5 strength b/l, tongue midline on protrusion with full lateral movement  Motor - Normal bulk and tone throughout. No pronator drift.  Strength testing             Deltoid      Biceps      Triceps     Wrist Extension    Wrist Flexion       R            4                 4               4-                     5                              5                    5  L             4                4               4-                     5                              5                     5              Hip Flexion    Hip Extension    Knee Flexion    Knee Extension    Dorsiflexion    Plantar Flexion  R             4                          4+                       5                           5                            5                          5  L              4+                       4+                        5                           5                            5                          5  Sensation - Light touch/temperature intact throughout excluding left lateral hip area in region of femoral cutaneous (decreased but sensation present)  DTR's - 3+ throughout +pec +suprapatellar, neutral toes, no caballero  Coordination - Finger to Nose intact b/l. No tremors appreciated  Gait and station - deferred    Labs:                        8.4    15.55 )-----------( 655      ( 14 Apr 2024 05:20 )             28.3     04-14    136  |  109<H>  |  12  ----------------------------<  156<H>  3.9   |  16<L>  |  0.77    Ca    8.0<L>      14 Apr 2024 05:20      CAPILLARY BLOOD GLUCOSE      POCT Blood Glucose.: 137 mg/dL (14 Apr 2024 08:01)            Radiology:     Neurology - Consult Note    Spectra: [phone] ([facility]), [phone] ([facility])    HPI:   74-year-old woman with past medical history of DM, HTN , 30 pound unintentional weight loss over past year, and new onset stress incontinence in the setting of fibroids, now s/p total abdominal hysterectomy, bilateral salpingo-oophorectomy, pelvic and para-aortic lymph nod dissection, lysis of adhesion, cystoscopy and ureteral catheter placement by urology (Dr. Guillaume) for uterine mass on 4/11.      Neurology consulted for post-op numbness on L lateral thigh.    Patient reports noticed night following procedure on 4/11 noticing numbness on left thigh, after nurse was wiping an alcohol swap. Reports no positionality component, not relieved with sitting, not changed with cough. Denies weakness. Denies other sensory loss. Reports no change in symptoms since onset.    Reports usually intact with ADLs however has been having children helping out with chores/driving for 2-3 months due to generalized fatigue.    No substance use. Previously worked in critical care unit as nurse, retired 5 years ago.  Surgeries- appendectomy 13-15y/o, fractured ankle 3 years prior.    Had rectus sheath blocks.    Labs WBC 18->15, Hgb 8.4     Review of Systems:   NEUROLOGICAL: +As stated in HPI above  All other review of systems is negative unless indicated above.    Allergies:  latex (Rash; Urticaria)  Levaquin (Rash)      PMHx/PSHx/Family Hx: As above, otherwise see below   No pertinent past medical history    No pertinent past medical history    HTN (hypertension)    DM (diabetes mellitus)    Palpitations    Palpitations    Hypothyroid    History of depression    Leiomyoma    Intra-abdominal and pelvic swelling, mass and lump, unspecified site    H/O fracture of ankle    History of unexplained fever    Urge incontinence    Stress incontinence        Social Hx:  as above    Vitals:  T(C): 36.6 (04-14-24 @ 10:19), Max: 36.6 (04-13-24 @ 18:11)  HR: 66 (04-14-24 @ 10:19) (66 - 81)  BP: 158/63 (04-14-24 @ 10:19) (141/56 - 158/63)  RR: 18 (04-14-24 @ 10:19) (18 - 20)  SpO2: 99% (04-14-24 @ 10:19) (98% - 100%)    Physical Examination:   General - non-toxic appearing female in no acute distress, intermittently coughing  Neurologic Exam:  Mental status - Awake, Alert, Oriented to person, place, and time. Speech fluent, repetition and naming intact. Follows simple and complex commands.  Cranial nerves - PERRLA (4mm -> 3mm b/l), VFF, EOMI, face sensation (V1-V3) intact b/l, facial strength intact without asymmetry b/l, hearing intact b/l, palate with symmetric elevation,  sternocleidomastiod 5/5 strength b/l, tongue midline on protrusion with full lateral movement  Motor - Normal bulk and tone throughout. No pronator drift.  Strength testing             Deltoid      Biceps      Triceps     Wrist Extension    Wrist Flexion       R            4                 4               4-                     5                              5                    5  L             4                4               4-                     5                              5                     5              Hip Flexion    Hip Extension    Knee Flexion    Knee Extension    Dorsiflexion    Plantar Flexion  R             4                          4+                       5                           5                            5                          5  L              4+                       4+                        5                           5                            5                          5  Sensation - Light touch/temperature intact throughout excluding left lateral hip area in region of femoral cutaneous (decreased but sensation present)  DTR's - 3+ throughout +pec +suprapatellar, neutral toes, + caballero b/l  Coordination - Finger to Nose intact b/l. No tremors appreciated  Gait and station - deferred    Labs:                        8.4    15.55 )-----------( 655      ( 14 Apr 2024 05:20 )             28.3     04-14    136  |  109<H>  |  12  ----------------------------<  156<H>  3.9   |  16<L>  |  0.77    Ca    8.0<L>      14 Apr 2024 05:20      CAPILLARY BLOOD GLUCOSE      POCT Blood Glucose.: 137 mg/dL (14 Apr 2024 08:01)            Radiology:     Neurology - Consult Note    Spectra: [phone] ([facility]), [phone] ([facility])    HPI:   74-year-old woman with past medical history of DM, HTN , 30 pound unintentional weight loss over past year, and new onset stress incontinence in the setting of fibroids, now s/p total abdominal hysterectomy, bilateral salpingo-oophorectomy, pelvic and para-aortic lymph nod dissection, lysis of adhesion, cystoscopy and ureteral catheter placement by urology (Dr. Guillaume) for uterine mass on 4/11.      Neurology consulted for post-op numbness on L lateral thigh.    Patient reports noticed night following procedure on 4/11 noticing numbness on left thigh, after nurse was wiping an alcohol swap. Reports no positionality component, not relieved with sitting, not changed with cough. Denies weakness. Denies other sensory loss. Reports no change in symptoms since onset.    Reports usually intact with ADLs however has been having children helping out with chores/driving for 2-3 months due to generalized fatigue.    No substance use. Previously worked in critical care unit as nurse, retired 5 years ago.  Surgeries- appendectomy 13-13y/o, fractured ankle 3 years prior.    Had rectus sheath blocks.    Labs WBC 18->15, Hgb 8.4     Review of Systems:   NEUROLOGICAL: +As stated in HPI above  All other review of systems is negative unless indicated above.    Allergies:  latex (Rash; Urticaria)  Levaquin (Rash)      PMHx/PSHx/Family Hx: As above, otherwise see below   No pertinent past medical history    No pertinent past medical history    HTN (hypertension)    DM (diabetes mellitus)    Palpitations    Palpitations    Hypothyroid    History of depression    Leiomyoma    Intra-abdominal and pelvic swelling, mass and lump, unspecified site    H/O fracture of ankle    History of unexplained fever    Urge incontinence    Stress incontinence        Social Hx:  as above    Vitals:  T(C): 36.6 (04-14-24 @ 10:19), Max: 36.6 (04-13-24 @ 18:11)  HR: 66 (04-14-24 @ 10:19) (66 - 81)  BP: 158/63 (04-14-24 @ 10:19) (141/56 - 158/63)  RR: 18 (04-14-24 @ 10:19) (18 - 20)  SpO2: 99% (04-14-24 @ 10:19) (98% - 100%)    Physical Examination:   General - non-toxic appearing female in no acute distress, intermittently coughing  Neurologic Exam:  Mental status - Awake, Alert, Oriented to person, place, and time. Speech fluent, repetition and naming intact. Follows simple and complex commands.  Cranial nerves - PERRLA (4mm -> 3mm b/l), VFF, EOMI, face sensation (V1-V3) intact b/l, facial strength intact without asymmetry b/l, hearing intact b/l, palate with symmetric elevation,  sternocleidomastiod 5/5 strength b/l, tongue midline on protrusion with full lateral movement  Motor - Normal bulk and tone throughout. No pronator drift.  Strength testing which is pain limited:            Deltoid      Biceps      Triceps     Wrist Extension    Wrist Flexion       R            4                 4               4-                     5                              5                    5  L             4                4               4-                     5                              5                     5              Hip Flexion    Hip Extension    Knee Flexion    Knee Extension    Dorsiflexion    Plantar Flexion  R             4                          4+                       5                           5                            5                          5  L              4+                       4+                        5                           5                            5                          5  Sensation - Light touch/temperature intact throughout excluding left lateral hip area in region of femoral cutaneous (decreased but sensation present)  DTR's - 3+ throughout +pec +suprapatellar, neutral toes, + caballero b/l  Coordination - Finger to Nose intact b/l. No tremors appreciated  Gait and station - deferred    Labs:                        8.4    15.55 )-----------( 655      ( 14 Apr 2024 05:20 )             28.3     04-14    136  |  109<H>  |  12  ----------------------------<  156<H>  3.9   |  16<L>  |  0.77    Ca    8.0<L>      14 Apr 2024 05:20      CAPILLARY BLOOD GLUCOSE      POCT Blood Glucose.: 137 mg/dL (14 Apr 2024 08:01)            Radiology:

## 2024-04-15 ENCOUNTER — TRANSCRIPTION ENCOUNTER (OUTPATIENT)
Age: 74
End: 2024-04-15

## 2024-04-15 VITALS
DIASTOLIC BLOOD PRESSURE: 71 MMHG | OXYGEN SATURATION: 100 % | TEMPERATURE: 97 F | RESPIRATION RATE: 18 BRPM | HEART RATE: 62 BPM | SYSTOLIC BLOOD PRESSURE: 158 MMHG

## 2024-04-15 LAB
GLUCOSE BLDC GLUCOMTR-MCNC: 105 MG/DL — HIGH (ref 70–99)
GLUCOSE BLDC GLUCOMTR-MCNC: 189 MG/DL — HIGH (ref 70–99)
NON-GYNECOLOGICAL CYTOLOGY STUDY: SIGNIFICANT CHANGE UP

## 2024-04-15 PROCEDURE — 99232 SBSQ HOSP IP/OBS MODERATE 35: CPT

## 2024-04-15 RX ORDER — ACETAMINOPHEN 500 MG
3 TABLET ORAL
Qty: 0 | Refills: 0 | DISCHARGE
Start: 2024-04-15

## 2024-04-15 RX ORDER — IBUPROFEN 200 MG
1 TABLET ORAL
Qty: 0 | Refills: 0 | DISCHARGE
Start: 2024-04-15

## 2024-04-15 RX ORDER — OXYCODONE HYDROCHLORIDE 5 MG/1
1 TABLET ORAL
Qty: 10 | Refills: 0
Start: 2024-04-15

## 2024-04-15 RX ADMIN — Medication 650 MILLIGRAM(S): at 13:22

## 2024-04-15 RX ADMIN — Medication 600 MILLIGRAM(S): at 06:11

## 2024-04-15 RX ADMIN — Medication 650 MILLIGRAM(S): at 12:12

## 2024-04-15 RX ADMIN — Medication 650 MILLIGRAM(S): at 05:11

## 2024-04-15 RX ADMIN — Medication 650 MILLIGRAM(S): at 06:11

## 2024-04-15 RX ADMIN — Medication 100 MICROGRAM(S): at 05:11

## 2024-04-15 RX ADMIN — Medication 600 MILLIGRAM(S): at 12:12

## 2024-04-15 RX ADMIN — FAMOTIDINE 20 MILLIGRAM(S): 10 INJECTION INTRAVENOUS at 12:12

## 2024-04-15 RX ADMIN — OXYCODONE HYDROCHLORIDE 5 MILLIGRAM(S): 5 TABLET ORAL at 00:09

## 2024-04-15 RX ADMIN — Medication 1: at 12:10

## 2024-04-15 RX ADMIN — SERTRALINE 100 MILLIGRAM(S): 25 TABLET, FILM COATED ORAL at 12:12

## 2024-04-15 RX ADMIN — Medication 600 MILLIGRAM(S): at 05:11

## 2024-04-15 RX ADMIN — Medication 600 MILLIGRAM(S): at 13:22

## 2024-04-15 RX ADMIN — Medication 25 MILLIGRAM(S): at 05:11

## 2024-04-15 RX ADMIN — SODIUM CHLORIDE 3 MILLILITER(S): 9 INJECTION INTRAMUSCULAR; INTRAVENOUS; SUBCUTANEOUS at 05:22

## 2024-04-15 NOTE — DIETITIAN INITIAL EVALUATION ADULT - PHYSICAL ASSESSMENT CLAVICLES
moderate Terbinafine Counseling: Patient counseling regarding adverse effects of terbinafine including but not limited to headache, diarrhea, rash, upset stomach, liver function test abnormalities, itching, taste/smell disturbance, nausea, abdominal pain, and flatulence.  There is a rare possibility of liver failure that can occur when taking terbinafine.  The patient understands that a baseline LFT and kidney function test may be required. The patient verbalized understanding of the proper use and possible adverse effects of terbinafine.  All of the patient's questions and concerns were addressed.

## 2024-04-15 NOTE — DISCHARGE NOTE PROVIDER - NSDCCPCAREPLAN_GEN_ALL_CORE_FT
PRINCIPAL DISCHARGE DIAGNOSIS  Diagnosis: Uterine malignant neoplasm  Assessment and Plan of Treatment:       SECONDARY DISCHARGE DIAGNOSES  Diagnosis: Hypothyroid  Assessment and Plan of Treatment:     Diagnosis: Palpitations  Assessment and Plan of Treatment:     Diagnosis: DM (diabetes mellitus)  Assessment and Plan of Treatment:

## 2024-04-15 NOTE — PROGRESS NOTE ADULT - ASSESSMENT
74y POD#4 s/p total abdominal hysterectomy, bilateral salpingo-oophorectomy, pelvic and para-aortic lymph node dissection, lysis of adhesion, cystoscopy and ureteral catheter placement by urology (Dr. Guillaume) for uterine mass. 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 On POD#1 patient was asymptomatically hypothermic, resolved with placement of Bairhugger. On POD#3 patient reported left thigh numbness, likely secondary to compression of left lateral femoral cutaneous nerve of thigh during surgery. Patient is clinically stable and progressing well postoperatively.    Neuro: s/p RS blocks. Tylenol, Motrin, Oxy PRN. L thigh numbness POD#3.  - h/o Depression, c/w home Sertraline 100mg  - Neuro: thigh numbness likely 2/2 compression of left lateral femoral cutaneous nerve of thigh during surgery, patient to follow-up outpatient, no acute intervention  - f/u PT evaluation  CV: Hemodynamically stable  - Anemia at baseline, post op H/H appropriate for : 8.2/28.8->8.0/26.8->8.0/27.3->8.4/28.3  - h/o HTN: 140-160/60-70s overnight  - h/o palpitations: continue home Metoprolol 25mg q12 hr  Pulm: Saturating well on room air, encourage oob/amb  GI: Continue regular diet. Pepcid, Senna, Simethicone, Zofran  : UOP adequate (600 cc overnight)  - s/p jenkins  - s/p bilateral ureteral catheter removal  Heme: Lovenox and SCDs for DVT ppx  - Discharge on Eliquis  FEN: SLIV.  Replete electrolytes prn   ID: Hypothermic on POD#1, now normothermic  - Baseline leukocytosis 2/2 CLL diagnosis, trend: 26->25->15  Endo: T2DM, hypothyroidism  - Appreciate GHOS recs: Lantus 10u, low dose ISS premeal and bedtime, hold Metformin  - FS overnight 130s-206, goal FS <200  - c/w Synthroid 100 mcg  Dispo: Discharge planning    Patient seen and examined with GYN ONC Team  Marleny Wood, PGY2

## 2024-04-15 NOTE — DIETITIAN INITIAL EVALUATION ADULT - LITERATURE/VIDEOS GIVEN
Printed handouts regarding Carbohydrate Counting for People with Diabetes, Plate Method for Diabetes, and Diabetes Label Reading Tips provided to patient

## 2024-04-15 NOTE — PHYSICAL THERAPY INITIAL EVALUATION ADULT - DID THE PATIENT HAVE SURGERY?
Hysterectomy, total, abdominal, with BSO and omentectomy; Abdominal hysterectomy with para-aortic and pelvic lymphadenectomy; Lysis of pelvic adhesions; Pelvic examination under anesthesia POD#4/yes

## 2024-04-15 NOTE — DIETITIAN INITIAL EVALUATION ADULT - NS FNS DIET ORDER
Diet, Regular:   Consistent Carbohydrate {Evening Snack} (CSTCHOSN)  Vegan {Accepts Vegetable Products Only} (04-12-24 @ 09:24) [Active]

## 2024-04-15 NOTE — DISCHARGE NOTE NURSING/CASE MANAGEMENT/SOCIAL WORK - PATIENT PORTAL LINK FT
You can access the FollowMyHealth Patient Portal offered by [facility] by registering at the following website: [email or web address]. By joining [employer]’s FollowMyHealth portal, you will also be able to view your health information using other applications (apps) compatible with our system.

## 2024-04-15 NOTE — DISCHARGE NOTE PROVIDER - NSDCCPTREATMENT_GEN_ALL_CORE_FT
PRINCIPAL PROCEDURE  Procedure: Hysterectomy, total, abdominal, with BSO and omentectomy  Findings and Treatment:       SECONDARY PROCEDURE  Procedure: Lysis of pelvic adhesions  Findings and Treatment:     Procedure: Pelvic examination under anesthesia  Findings and Treatment:     Procedure: Abdominal hysterectomy with para-aortic and pelvic lymphadenectomy  Findings and Treatment:

## 2024-04-15 NOTE — PHYSICAL THERAPY INITIAL EVALUATION ADULT - PERTINENT HX OF CURRENT PROBLEM, REHAB EVAL
Pt is a 74 year old female presenting for scheduled surgery for pelvic mass. Post-op course c/b hypothermia and left LE numbness.

## 2024-04-15 NOTE — PHYSICAL THERAPY INITIAL EVALUATION ADULT - GROSSLY INTACT, SENSORY
Light touch bilateral LE normal except impaired along lateral aspect of left LE (likely lateral cutaneous nerve)

## 2024-04-15 NOTE — DIETITIAN INITIAL EVALUATION ADULT - ENTER TO (CAL/KG)
35 Birth Control Pills Pregnancy And Lactation Text: This medication should be avoided if pregnant and for the first 30 days post-partum.

## 2024-04-15 NOTE — PROGRESS NOTE ADULT - PROBLEM SELECTOR PLAN 1
-s/p MARTÍN/BSO, omentectomy 4/11  -chronic leukocytosis  2/2 CLL, with fluctuation d/t recent surgery. will monitor any signs of infection closely given hypothermic event post-op  -post-op wound care, jenkins, pain control and DVT ppx as per primary team
-s/p MARTÍN/BSO, omentectomy 4/11  -chronic leukocytosis  2/2 CLL, with fluctuation d/t recent surgery. will monitor any signs of infection closely given hypothermic event post-op  -post-op wound care, jenkins, pain control and DVT ppx as per primary team
Gyn Onc Fellow Addendum:    Pt seen and examined at bedside. Agree with above.    VS reviewed  Labs reviewed    Hemodynamically stable, continue to monitor VS   Continue current pain regimen  Reg diet  Voiding spontaneously  Continue home metoprolol  Appreciate GHOS recs, continue Lantus 10, ISS  Left thigh numbness, strength intact, appreciate Neuro recs and PT consult  Encourage ambulation and IS use  Replete lytes prn  DVT ppx: Lovenox, Venodynes   Dispo: cont inpt mgmt    Aundrea Recio MD
-s/p MARTÍN/BSO, omentectomy 4/11  -chronic leukocytosis  2/2 CLL, with fluctuation d/t recent surgery. will monitor any signs of infection closely given hypothermic event post-op  -post-op wound care, jenkins, pain control and DVT ppx as per primary team
-s/p MARTÍN/BSO, omentectomy 4/11  -chronic leukocytosis  2/2 CLL, with fluctuation d/t recent surgery. will monitor any signs of infection closely given hypothermic event post-op  -post-op wound care, pain control and DVT ppx as per primary team

## 2024-04-15 NOTE — DIETITIAN INITIAL EVALUATION ADULT - OTHER INFO
Patient is a 74y Female with PMH HTN, IDDM, hypothyroidism, CLL not on treatment, uterine ca s/p MARTÍN/BSO, omentectomy 4/11    Patient is currently ordered for a PO diet, vegan. As noted above, patient accepts chicken, eggs, and dairy; recommend adjust diet order accordingly. Patient currently reports a fair appetite and PO intake at meals during course of admission, gradually improving. Documented on RN flowsheet as consuming 51-75% of meals. Patient denies any chewing or swallowing difficulty with current diet order. No report of GI distress (nausea, vomiting, diarrhea, constipation). Last BM 4/14/24 per RN flowsheet documentation. Noted to be on a bowel regimen. Noted HbA1c 7.6% (4/5)    Patient with plans to discharge home to live with her sister temporarily, reports she has strong family support to help motivate her eating. Writer encouraged patient to increased PO intake as able to better meet kcal and protein needs. Patient provided with verbal and written education regarding nutrition in the context of diabetes mellitus management. Printed handouts regarding Carbohydrate Counting for People with Diabetes, Plate Method for Diabetes, and Diabetes Label Reading Tips provided to patient. Encouraged patient to follow-up with an outpatient dietitian for continued nutrition counseling. Patient verbalized understanding to the discussion. Patient able to teach back 1-3 education points.

## 2024-04-15 NOTE — PHYSICAL THERAPY INITIAL EVALUATION ADULT - GAIT DEVIATIONS NOTED, PT EVAL
decreased bhavani/decreased velocity of limb motion/decreased step length/decreased stride length/decreased weight-shifting ability

## 2024-04-15 NOTE — DISCHARGE NOTE NURSING/CASE MANAGEMENT/SOCIAL WORK - NSDCFUADDAPPT_GEN_ALL_CORE_FT
[facility]  [id number]  [phone]  2 weeks

## 2024-04-15 NOTE — DISCHARGE NOTE PROVIDER - PROVIDER TOKENS
PROVIDER:[TOKEN:[[id number]],SCHEDULEDAPPT:[04/26/2024],SCHEDULEDAPPTTIME:[12:30 PM],ESTABLISHEDPATIENT:[T]]

## 2024-04-15 NOTE — PROGRESS NOTE ADULT - SUBJECTIVE AND OBJECTIVE BOX
[facility] Division of Hospital Medicine  Leticia Cano MD  Pager (M-F, 8A-5P):  In-house pager [phone]; Long-range pager [phone]  Other Times:  Please page Hospitalist in Charge -  In-house pager [phone]    Patient is a 74y old  Female who presents with a chief complaint of GHOS (14 Apr 2024 13:05)    SUBJECTIVE / OVERNIGHT EVENTS:  Pt up walking in room, doing ok. Getting ready to go home. Going to be staying with a family member. No chest pain, SOB. Hurts belly when coughs or takes deep breath.   Wears dexcom continuous glucose monitor, 230 now prelunch.   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 She adjusts her insulin based on her sugars and diet. Will monitor closely on discharge and her family will make sure she has food regularly.  ADDITIONAL REVIEW OF SYSTEMS:    MEDICATIONS  (STANDING):  acetaminophen     Tablet .. 650 milliGRAM(s) Oral every 6 hours  dextrose 10% Bolus 125 milliLiter(s) IV Bolus once  dextrose 5%. 1000 milliLiter(s) (50 mL/Hr) IV Continuous <Continuous>  dextrose 5%. 1000 milliLiter(s) (100 mL/Hr) IV Continuous <Continuous>  dextrose 50% Injectable 25 Gram(s) IV Push once  dextrose 50% Injectable 25 Gram(s) IV Push once  dextrose 50% Injectable 12.5 Gram(s) IV Push once  dextrose Oral Gel 15 Gram(s) Oral once  enoxaparin Injectable 40 milliGRAM(s) SubCutaneous every 24 hours  famotidine    Tablet 20 milliGRAM(s) Oral daily  glucagon  Injectable 1 milliGRAM(s) IntraMuscular once  glucagon  Injectable 1 milliGRAM(s) IntraMuscular once  ibuprofen  Tablet. 600 milliGRAM(s) Oral every 6 hours  insulin glargine Injectable (LANTUS) 10 Unit(s) SubCutaneous at bedtime  insulin lispro (ADMELOG) corrective regimen sliding scale   SubCutaneous three times a day before meals  insulin lispro (ADMELOG) corrective regimen sliding scale   SubCutaneous at bedtime  levothyroxine 100 MICROGram(s) Oral daily  metoprolol tartrate 25 milliGRAM(s) Oral two times a day  sertraline 100 milliGRAM(s) Oral daily  sodium chloride 0.9% lock flush 3 milliLiter(s) IV Push every 8 hours    MEDICATIONS  (PRN):  benzocaine/menthol Lozenge 1 Lozenge Oral every 3 hours PRN Sore Throat  dextrose Oral Gel 15 Gram(s) Oral once PRN Blood Glucose LESS THAN 70 milliGRAM(s)/deciliter  metoprolol tartrate Injectable 5 milliGRAM(s) IV Push every 6 hours PRN hypertension  ondansetron    Tablet 8 milliGRAM(s) Oral every 8 hours PRN Nausea and/or Vomiting  oxyCODONE    IR 5 milliGRAM(s) Oral every 3 hours PRN Moderate Pain (4 - 6)  senna 2 Tablet(s) Oral at bedtime PRN Constipation  simethicone 80 milliGRAM(s) Chew every 6 hours PRN Gas    CAPILLARY BLOOD GLUCOSE  POCT Blood Glucose.: 105 mg/dL (15 Apr 2024 07:54)  POCT Blood Glucose.: 150 mg/dL (14 Apr 2024 21:09)  POCT Blood Glucose.: 206 mg/dL (14 Apr 2024 17:08)  POCT Blood Glucose.: 195 mg/dL (14 Apr 2024 12:04)    I&O's Summary    14 Apr 2024 07:01  -  15 Apr 2024 07:00  --------------------------------------------------------  IN: 1000 mL / OUT: 1750 mL / NET: -750 mL    15 Apr 2024 07:01  -  15 Apr 2024 11:42  --------------------------------------------------------  IN: 300 mL / OUT: 200 mL / NET: 100 mL    PHYSICAL EXAM:  Vital Signs Last 24 Hrs  T(C): 36.4 (15 Apr 2024 09:47), Max: 36.7 (14 Apr 2024 17:14)  T(F): 97.6 (15 Apr 2024 09:47), Max: 98 (14 Apr 2024 17:14)  HR: 65 (15 Apr 2024 09:47) (65 - 72)  BP: 155/52 (15 Apr 2024 09:47) (137/78 - 160/63)  BP(mean): --  RR: 17 (15 Apr 2024 09:47) (17 - 20)  SpO2: 100% (15 Apr 2024 09:47) (100% - 100%)    Parameters below as of 15 Apr 2024 09:47  Patient On (Oxygen Delivery Method): room air    CONSTITUTIONAL: thin female, walking in room, NAD  RESPIRATORY: Normal respiratory effort; lungs are clear to auscultation bilaterally  CARDIOVASCULAR: Regular rate and rhythm, normal S1 and S2, no murmur/rub/gallop; No lower extremity edema  ABDOMEN: binder in place  MUSCLOSKELETAL:  Normal gait; no clubbing or cyanosis of digits; no joint swelling or tenderness to palpation  PSYCH: A+O to person, place, and time; affect appropriate  NEUROLOGY: nonfocal     LABS:                        8.4    15.55 )-----------( 655      ( 14 Apr 2024 05:20 )             28.3     04-14    136  |  109<H>  |  12  ----------------------------<  156<H>  3.9   |  16<L>  |  0.77    Ca    8.0<L>      14 Apr 2024 05:20    RADIOLOGY & ADDITIONAL TESTS:  Results Reviewed:   Imaging Personally Reviewed:  Electrocardiogram Personally Reviewed:    COORDINATION OF CARE:  Care Discussed with Consultants/Other Providers [Y/N]: GYN-ONC re overall care  Prior or Outpatient Records Reviewed [Y/N]:

## 2024-04-15 NOTE — DISCHARGE NOTE PROVIDER - NSDCMRMEDTOKEN_GEN_ALL_CORE_FT
acetaminophen 325 mg oral tablet: 3 tab(s) orally every 6 hours  Admelog 100 units/mL injectable solution: 3 international unit(s) injectable 3 times a day (with meals)  apixaban 2.5 mg oral tablet: 1 tab(s) orally 2 times a day  ibuprofen 600 mg oral tablet: 1 tab(s) orally every 6 hours  Januvia 50 mg oral tablet: 1 tab(s) orally once a day  Lantus 100 units/mL subcutaneous solution: 12 unit(s) subcutaneous once a day (at bedtime)  metFORMIN 1000 mg oral tablet: 1 tab(s) orally 2 times a day  metoprolol tartrate 25 mg oral tablet: 1 tab(s) orally 2 times a day  oxyCODONE 5 mg oral tablet: 1 tab(s) orally every 6 hours as needed for  severe pain MDD: 4 tabs  Physical Therapy: 2-3X per week MDD: 1  Synthroid 100 mcg (0.1 mg) oral tablet: 1 tab(s) orally once a day  Zoloft 100 mg oral tablet: 1 tab(s) orally once a day

## 2024-04-15 NOTE — DIETITIAN INITIAL EVALUATION ADULT - ORAL NUTRITION SUPPLEMENTS
Consider Glucsafia Josephke (provides 220kcal, 10gms protein per serving) BID to support nutrition needs

## 2024-04-15 NOTE — ASU PREOP CHECKLIST - NS ASU TO WHOM HOLDING TO OR
Tazorac Counseling:  Patient advised that medication is irritating and drying.  Patient may need to apply sparingly and wash off after an hour before eventually leaving it on overnight.  The patient verbalized understanding of the proper use and possible adverse effects of tazorac.  All of the patient's questions and concerns were addressed.
Aklief Pregnancy And Lactation Text: It is unknown if this medication is safe to use during pregnancy.  It is unknown if this medication is excreted in breast milk.  Breastfeeding women should use the topical cream on the smallest area of the skin for the shortest time needed while breastfeeding.  Do not apply to nipple and areola.
Winlevi Pregnancy And Lactation Text: This medication is considered safe during pregnancy and breastfeeding.
Detail Level: Detailed
Sarecycline Counseling: Patient advised regarding possible photosensitivity and discoloration of the teeth, skin, lips, tongue and gums.  Patient instructed to avoid sunlight, if possible.  When exposed to sunlight, patients should wear protective clothing, sunglasses, and sunscreen.  The patient was instructed to call the office immediately if the following severe adverse effects occur:  hearing changes, easy bruising/bleeding, severe headache, or vision changes.  The patient verbalized understanding of the proper use and possible adverse effects of sarecycline.  All of the patient's questions and concerns were addressed.
Birth Control Pills Counseling: Birth Control Pill Counseling: I discussed with the patient the potential side effects of OCPs including but not limited to increased risk of stroke, heart attack, thrombophlebitis, deep venous thrombosis, hepatic adenomas, breast changes, GI upset, headaches, and depression.  The patient verbalized understanding of the proper use and possible adverse effects of OCPs. All of the patient's questions and concerns were addressed.
Erythromycin Pregnancy And Lactation Text: This medication is Pregnancy Category B and is considered safe during pregnancy. It is also excreted in breast milk.
Topical Sulfur Applications Pregnancy And Lactation Text: This medication is Pregnancy Category C and has an unknown safety profile during pregnancy. It is unknown if this topical medication is excreted in breast milk.
Use Enhanced Medication Counseling?: No
Dapsone Counseling: I discussed with the patient the risks of dapsone including but not limited to hemolytic anemia, agranulocytosis, rashes, methemoglobinemia, kidney failure, peripheral neuropathy, headaches, GI upset, and liver toxicity.  Patients who start dapsone require monitoring including baseline LFTs and weekly CBCs for the first month, then every month thereafter.  The patient verbalized understanding of the proper use and possible adverse effects of dapsone.  All of the patient's questions and concerns were addressed.
Isotretinoin Pregnancy And Lactation Text: This medication is Pregnancy Category X and is considered extremely dangerous during pregnancy. It is unknown if it is excreted in breast milk.
Benzoyl Peroxide Counseling: Patient counseled that medicine may cause skin irritation and bleach clothing.  In the event of skin irritation, the patient was advised to reduce the amount of the drug applied or use it less frequently.   The patient verbalized understanding of the proper use and possible adverse effects of benzoyl peroxide.  All of the patient's questions and concerns were addressed.
Topical Clindamycin Pregnancy And Lactation Text: This medication is Pregnancy Category B and is considered safe during pregnancy. It is unknown if it is excreted in breast milk.
Topical Retinoid counseling:  Patient advised to apply a pea-sized amount only at bedtime and wait 30 minutes after washing their face before applying.  If too drying, patient may add a non-comedogenic moisturizer. The patient verbalized understanding of the proper use and possible adverse effects of retinoids.  All of the patient's questions and concerns were addressed.
Tetracycline Pregnancy And Lactation Text: This medication is Pregnancy Category D and not consider safe during pregnancy. It is also excreted in breast milk.
Bactrim Counseling:  I discussed with the patient the risks of sulfa antibiotics including but not limited to GI upset, allergic reaction, drug rash, diarrhea, dizziness, photosensitivity, and yeast infections.  Rarely, more serious reactions can occur including but not limited to aplastic anemia, agranulocytosis, methemoglobinemia, blood dyscrasias, liver or kidney failure, lung infiltrates or desquamative/blistering drug rashes.
Doxycycline Pregnancy And Lactation Text: This medication is Pregnancy Category D and not consider safe during pregnancy. It is also excreted in breast milk but is considered safe for shorter treatment courses.
Minocycline Counseling: Patient advised regarding possible photosensitivity and discoloration of the teeth, skin, lips, tongue and gums.  Patient instructed to avoid sunlight, if possible.  When exposed to sunlight, patients should wear protective clothing, sunglasses, and sunscreen.  The patient was instructed to call the office immediately if the following severe adverse effects occur:  hearing changes, easy bruising/bleeding, severe headache, or vision changes.  The patient verbalized understanding of the proper use and possible adverse effects of minocycline.  All of the patient's questions and concerns were addressed.
Winlevi Counseling:  I discussed with the patient the risks of topical clascoterone including but not limited to erythema, scaling, itching, and stinging. Patient voiced their understanding.
Topical Retinoid Pregnancy And Lactation Text: This medication is Pregnancy Category C. It is unknown if this medication is excreted in breast milk.
Azithromycin Counseling:  I discussed with the patient the risks of azithromycin including but not limited to GI upset, allergic reaction, drug rash, diarrhea, and yeast infections.
Dapsone Pregnancy And Lactation Text: This medication is Pregnancy Category C and is not considered safe during pregnancy or breast feeding.
High Dose Vitamin A Counseling: Side effects reviewed, pt to contact office should one occur.
Spironolactone Pregnancy And Lactation Text: This medication can cause feminization of the male fetus and should be avoided during pregnancy. The active metabolite is also found in breast milk.
Birth Control Pills Pregnancy And Lactation Text: This medication should be avoided if pregnant and for the first 30 days post-partum.
Isotretinoin Counseling: Patient should get monthly blood tests, not donate blood, not drive at night if vision affected, not share medication, and not undergo elective surgery for 6 months after tx completed. Side effects reviewed, pt to contact office should one occur.
Tazorac Pregnancy And Lactation Text: This medication is not safe during pregnancy. It is unknown if this medication is excreted in breast milk.
Azelaic Acid Counseling: Patient counseled that medicine may cause skin irritation and to avoid applying near the eyes.  In the event of skin irritation, the patient was advised to reduce the amount of the drug applied or use it less frequently.   The patient verbalized understanding of the proper use and possible adverse effects of azelaic acid.  All of the patient's questions and concerns were addressed.
Tetracycline Counseling: Patient counseled regarding possible photosensitivity and increased risk for sunburn.  Patient instructed to avoid sunlight, if possible.  When exposed to sunlight, patients should wear protective clothing, sunglasses, and sunscreen.  The patient was instructed to call the office immediately if the following severe adverse effects occur:  hearing changes, easy bruising/bleeding, severe headache, or vision changes.  The patient verbalized understanding of the proper use and possible adverse effects of tetracycline.  All of the patient's questions and concerns were addressed. Patient understands to avoid pregnancy while on therapy due to potential birth defects.
High Dose Vitamin A Pregnancy And Lactation Text: High dose vitamin A therapy is contraindicated during pregnancy and breast feeding.
Azithromycin Pregnancy And Lactation Text: This medication is considered safe during pregnancy and is also secreted in breast milk.
Doxycycline Counseling:  Patient counseled regarding possible photosensitivity and increased risk for sunburn.  Patient instructed to avoid sunlight, if possible.  When exposed to sunlight, patients should wear protective clothing, sunglasses, and sunscreen.  The patient was instructed to call the office immediately if the following severe adverse effects occur:  hearing changes, easy bruising/bleeding, severe headache, or vision changes.  The patient verbalized understanding of the proper use and possible adverse effects of doxycycline.  All of the patient's questions and concerns were addressed.
Topical Clindamycin Counseling: Patient counseled that this medication may cause skin irritation or allergic reactions.  In the event of skin irritation, the patient was advised to reduce the amount of the drug applied or use it less frequently.   The patient verbalized understanding of the proper use and possible adverse effects of clindamycin.  All of the patient's questions and concerns were addressed.
Spironolactone Counseling: Patient advised regarding risks of diarrhea, abdominal pain, hyperkalemia, birth defects (for female patients), liver toxicity and renal toxicity. The patient may need blood work to monitor liver and kidney function and potassium levels while on therapy. The patient verbalized understanding of the proper use and possible adverse effects of spironolactone.  All of the patient's questions and concerns were addressed.
Azelaic Acid Pregnancy And Lactation Text: This medication is considered safe during pregnancy and breast feeding.
Bactrim Pregnancy And Lactation Text: This medication is Pregnancy Category D and is known to cause fetal risk.  It is also excreted in breast milk.
Erythromycin Counseling:  I discussed with the patient the risks of erythromycin including but not limited to GI upset, allergic reaction, drug rash, diarrhea, increase in liver enzymes, and yeast infections.
Aklief counseling:  Patient advised to apply a pea-sized amount only at bedtime and wait 30 minutes after washing their face before applying.  If too drying, patient may add a non-comedogenic moisturizer.  The most commonly reported side effects including irritation, redness, scaling, dryness, stinging, burning, itching, and increased risk of sunburn.  The patient verbalized understanding of the proper use and possible adverse effects of retinoids.  All of the patient's questions and concerns were addressed.
Benzoyl Peroxide Pregnancy And Lactation Text: This medication is Pregnancy Category C. It is unknown if benzoyl peroxide is excreted in breast milk.
Topical Sulfur Applications Counseling: Topical Sulfur Counseling: Patient counseled that this medication may cause skin irritation or allergic reactions.  In the event of skin irritation, the patient was advised to reduce the amount of the drug applied or use it less frequently.   The patient verbalized understanding of the proper use and possible adverse effects of topical sulfur application.  All of the patient's questions and concerns were addressed.
Sunscreen Recommendation Label Override: Broad Spectrum Sunscreen SPF 30+
Detail Level: Simple
babar vásquez rn

## 2024-04-15 NOTE — PROGRESS NOTE ADULT - TIME BILLING
50 minutes of total time dedicated to this patient visit today including preparing to see the patient (eg. review of tests), obtaining and/or reviewing separately obtained history, obtaining/reviewing vitals, performing a medically appropriate examination and/or evaluation, counseling and educating the patient/family/caregiver, reviewing previous notes and test results, and procedures, communicating with other health professionals (when not separately reported), and documenting clinical information in the electronic health record.
Preparing to see the patient including review of tests and other providers' notes, confirming history with patient/family member, performing medical examination and evaluation, counseling and educating the patient/family/caregiver, ordering medications, tests and procedures, communicating with other health care professionals, documenting clinical information in the EMR, independently interpreting results and communicating results to the patient/family/caregiver, care coordination

## 2024-04-15 NOTE — PROGRESS NOTE ADULT - PROBLEM SELECTOR PLAN 4
BP controlled at goal  -c/w metoprolol 25 bid

## 2024-04-15 NOTE — DISCHARGE NOTE PROVIDER - NSDCFUADDINST_GEN_ALL_CORE_FT
Return to your regular way of eating. Resume your home regimen for Diabetes. Resume normal activity as tolerated, but no heavy lifting or strenuous activity until cleared by your doctor. No driving for the next 2 weeks and/or while on narcotic pain medication. Complete vaginal rest, no tampons, no douching, no tub bathing, no sexual activities until instructed by your doctor. Expect to have vaginal bleeding/spotting that will get lighter over time. For bleeding soaking through more than two pads per hour for two consecutive hours, or for passing clots greater than the size of your fist, call your doctor or come to the emergency department. Call your doctor with any signs or symptoms of infection such as fevers, chills, nausea or vomiting. Call your doctor with redness or swelling at the incision site, fluid leakage from the incision, or wound separation. Do not pull or cut any stitches you see around your incision. Call your doctor if you're unable to tolerate food or have difficulty urinating. Call your doctor if you have pain that is not relieved by your prescribed medications. Notify your doctor with any other concerns. Follow up with Dr. Cassidy as scheduled in 2 weeks.    You can take up to 600 mg Ibuprofen every 6 hours and/or 975 mg Tylenol every 6 hours for pain. You can take 5 mg Oxycodone every 6 hours as needed for severe pain.

## 2024-04-15 NOTE — PHYSICAL THERAPY INITIAL EVALUATION ADULT - GENERAL OBSERVATIONS, REHAB EVAL
Upon entry, pt seated in recliner in NAD. HR 70 bpm. Pt left as received with all tubes/lines intact, call bell in reach and in NAD.

## 2024-04-15 NOTE — PROGRESS NOTE ADULT - SUBJECTIVE AND OBJECTIVE BOX
Gyn ONC Progress Note POD#4/HD#5    Subjective:   Patient seen and examined at bedside. No acute events overnight. No acute complaints. Reports left thigh numbness is stable and does not impair her ability to ambulate. Pain well controlled. Patient is ambulating and tolerating carb consistent regular diet. Patient is passing flatus and bowel movements. Patient is voiding spontaneously. Denies lightheadedness, dizziness, CP, SOB, N/V, fevers, and chills.    MEDICATIONS  (STANDING):  acetaminophen     Tablet .. 650 milliGRAM(s) Oral every 6 hours  dextrose 10% Bolus 125 milliLiter(s) IV Bolus once  dextrose 5%. 1000 milliLiter(s) (50 mL/Hr) IV Continuous <Continuous>  dextrose 5%. 1000 milliLiter(s) (100 mL/Hr) IV Continuous <Continuous>  dextrose 50% Injectable 25 Gram(s) IV Push once  dextrose 50% Injectable 25 Gram(s) IV Push once  dextrose 50% Injectable 12.5 Gram(s) IV Push once  dextrose 50% Injectable 25 Gram(s) IV Push once  dextrose 50% Injectable 12.5 Gram(s) IV Push once  dextrose Oral Gel 15 Gram(s) Oral once  enoxaparin Injectable 40 milliGRAM(s) SubCutaneous every 24 hours  famotidine    Tablet 20 milliGRAM(s) Oral daily  glucagon  Injectable 1 milliGRAM(s) IntraMuscular once  glucagon  Injectable 1 milliGRAM(s) IntraMuscular once  ibuprofen  Tablet. 600 milliGRAM(s) Oral every 6 hours  insulin glargine Injectable (LANTUS) 10 Unit(s) SubCutaneous at bedtime  insulin lispro (ADMELOG) corrective regimen sliding scale   SubCutaneous three times a day before meals  insulin lispro (ADMELOG) corrective regimen sliding scale   SubCutaneous at bedtime  levothyroxine 100 MICROGram(s) Oral daily  metoprolol tartrate 25 milliGRAM(s) Oral two times a day  sertraline 100 milliGRAM(s) Oral daily  sodium chloride 0.9% lock flush 3 milliLiter(s) IV Push every 8 hours    MEDICATIONS  (PRN):  benzocaine/menthol Lozenge 1 Lozenge Oral every 3 hours PRN Sore Throat  dextrose Oral Gel 15 Gram(s) Oral once PRN Blood Glucose LESS THAN 70 milliGRAM(s)/deciliter  metoprolol tartrate Injectable 5 milliGRAM(s) IV Push every 6 hours PRN hypertension  ondansetron    Tablet 8 milliGRAM(s) Oral every 8 hours PRN Nausea and/or Vomiting  oxyCODONE    IR 5 milliGRAM(s) Oral every 3 hours PRN Moderate Pain (4 - 6)  senna 2 Tablet(s) Oral at bedtime PRN Constipation  simethicone 80 milliGRAM(s) Chew every 6 hours PRN Gas      Objective:  T(F): 97.3 (04-15-24 @ 01:26), Max: 98 (04-14-24 @ 17:14)  HR: 72 (04-15-24 @ 01:26) (65 - 72)  BP: 137/78 (04-15-24 @ 01:26) (137/78 - 160/63)  RR: 20 (04-15-24 @ 01:26) (18 - 20)  SpO2: 100% (04-15-24 @ 01:26) (99% - 100%)  Wt(kg): --  CAPILLARY BLOOD GLUCOSE      POCT Blood Glucose.: 150 mg/dL (14 Apr 2024 21:09)  POCT Blood Glucose.: 206 mg/dL (14 Apr 2024 17:08)  POCT Blood Glucose.: 195 mg/dL (14 Apr 2024 12:04)  POCT Blood Glucose.: 137 mg/dL (14 Apr 2024 08:01)      Physical Exam:  Constitutional: NAD, A+O x3  CV: Clinically well-perfused, RRR  Lungs: Breathing comfortably on room air, CTAB  Abdomen: Soft, nondistended, appropriately tender, no guarding/rebound  Incision: midline vertical incision with dermabond prineo in place, clean/dry/intact  Ext: No lower extremity edema or calf tenderness bilaterally    LABS:                          8.4    15.55 )-----------( 655      ( 14 Apr 2024 05:20 )             28.3   baso 0.5    eos 5.7    imm gran 1.1    lymph 11.5   mono 2.4    poly 78.8                         8.4    18.48 )-----------( 659      ( 13 Apr 2024 04:50 )             28.7   baso 0.5    eos 3.1    imm gran 0.9    lymph 7.7    mono 2.2    poly 85.6     04-14    136    |  109<H>  |  12     ----------------------------<  156<H>  3.9     |  16<L>  |  0.77     Ca    8.0<L>      14 Apr 2024 05:20            Urinalysis Basic - ( 14 Apr 2024 05:20 )    Color: x / Appearance: x / SG: x / pH: x  Gluc: 156 mg/dL / Ketone: x  / Bili: x / Urobili: x   Blood: x / Protein: x / Nitrite: x   Leuk Esterase: x / RBC: x / WBC x   Sq Epi: x / Non Sq Epi: x / Bacteria: x        I&O's Summary    13 Apr 2024 07:01  -  14 Apr 2024 07:00  --------------------------------------------------------  IN: 800 mL / OUT: 600 mL / NET: 200 mL    14 Apr 2024 07:01  -  15 Apr 2024 06:42  --------------------------------------------------------  IN: 880 mL / OUT: 1200 mL / NET: -320 mL

## 2024-04-15 NOTE — PROGRESS NOTE ADULT - ATTENDING COMMENTS
I have examined the patient and discussed the treatment plan with the patient as well as fellows and residents involved in the ongoing care.  We have reviewed pertinent clinical findings and I agree with the findings and plan detailed above with the following addendum.    74y POD#4 s/p total abdominal hysterectomy, bilateral salpingo-oophorectomy, pelvic and para-aortic lymph node dissection, lysis of adhesion, cystoscopy and ureteral catheter placement    Labs and vitals reviewed  Patient well appearing, incision dry  Ambulating and tolerating diet  D/C home with follow up     Milla Cheung MD, FACOG  Gynecologic Oncology
Patient seen and examined, agree with gyn onc fellow and resident  POD#2  Doing well  Routine postop care  Labs stable  Exam benign  Monitor LE numbness
Patient seen and examined, agree with gyn onc fellow and resident  POD#3  Doing well  Routine postop care  Labs stable  Exam benign  Still with left thigh numbness, will consult PT and neuro

## 2024-04-15 NOTE — PROGRESS NOTE ADULT - PROBLEM SELECTOR PLAN 3
-check TSH, T4 given hypothermic event  -c/w home dose synthroid 100 for now
-check TSH, T4 given hypothermic event  -c/w home dose synthroid 100 for now
nl TSH, T4   -c/w home dose synthroid 100
-check TSH, T4 given hypothermic event  -c/w home dose synthroid 100 for now

## 2024-04-15 NOTE — DIETITIAN NUTRITION RISK NOTIFICATION - ADDITIONAL COMMENTS/DIETITIAN RECOMMENDATIONS
Please see Dietitian Initial Assessment for complete recommendations.     Crys Guido MS RDN CDN  On [employer] Teams, Pager #[phone]

## 2024-04-15 NOTE — PHYSICAL THERAPY INITIAL EVALUATION ADULT - PATIENT PROFILE REVIEW, REHAB EVAL
PT initial evaluation received and chart review completed. Pt agreeable to participate in PT evaluation. ACTIVITY: INCREASE AS TOLERATED/yes

## 2024-04-15 NOTE — DISCHARGE NOTE PROVIDER - CARE PROVIDER_API CALL
Justin Cassidy  Gynecologic Oncology  [address] [place] [id number]  Phone: [phone]  Fax: [phone]  Established Patient  Scheduled Appointment: 04/26/2024 12:30 PM

## 2024-04-15 NOTE — DISCHARGE NOTE PROVIDER - DETAILS OF MALNUTRITION DIAGNOSIS/DIAGNOSES
This patient has been assessed with a concern for Malnutrition and was treated during this hospitalization for the following Nutrition diagnosis/diagnoses:     -  04/15/2024: Moderate protein-calorie malnutrition   -  04/15/2024: Underweight (BMI < 19)

## 2024-04-15 NOTE — PROGRESS NOTE ADULT - PROBLEM SELECTOR PLAN 2
A1c 7.6  -home dose lantus 12 u, premeal lispro 3 u tid. metformin, Januvia   --> d/w pt, she will resume home DM regimen and adjust as needed based on dexcom measurements and PO intake, as she normally does  - has f/u with her endocrinologist Dr. 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
A1c 7.6 -250  -home dose lantus 12 u, premeal lispro 3 u tid. metformin, Januvia   -received lantus 6 u last time, increased to  10 u tonight. c/w ISS. titrate as needed
A1c 7.6 -250  -home dose lantus 12 u, premeal lispro 3 u tid. metformin, Januvia   -received lantus 6 u last time, increased to  10 u tonight. c/w ISS. titrate as needed after 24 hours on this regimen   Paresthesia on palpation lat left thigh, monitor , no weakness , waking, c/o generalized weakness  F/w neuro consult   PT yoselyn
A1c 7.6 -250  -home dose lantus 12 u, premeal lispro 3 u tid. metformin, Januvia   -received lantus 6 u last time, increased to  10 u tonight. c/w ISS. titrate as needed after 24 hours on this regimen   Paresthesia on palpation lat left thigh, monitor , no weakness , waking, c/o generalized weakness  PT eval

## 2024-04-15 NOTE — DIETITIAN INITIAL EVALUATION ADULT - PERTINENT LABORATORY DATA
04-14    136  |  109<H>  |  12  ----------------------------<  156<H>  3.9   |  16<L>  |  0.77    Ca    8.0<L>      14 Apr 2024 05:20    POCT Blood Glucose.: 189 mg/dL (04-15-24 @ 11:55)  A1C with Estimated Average Glucose Result: 7.6 % (04-05-24 @ 08:20)

## 2024-04-15 NOTE — PROGRESS NOTE ADULT - ASSESSMENT
75 yo F with hx of HTN, IDDM, hypothyroidism, CLL not on treatment, uterine ca s/p MARTÍN/BSO, omentectomy 4/11

## 2024-04-15 NOTE — DISCHARGE NOTE NURSING/CASE MANAGEMENT/SOCIAL WORK - NSDCPNINST_GEN_ALL_CORE
Please return to ER or call your provider for any abnormal pain. Please also return for any abnormal drainage, oozing from your incisions or any signs of infection such as temp above 100.4.

## 2024-04-15 NOTE — DIETITIAN INITIAL EVALUATION ADULT - ORAL INTAKE PTA/DIET HISTORY
Patient reports no known food allergies or food intolerances. Patient denies any chewing or swallowing difficulty with regular solids or thin liquids. Patient does not consume fish, pork, or beef. Accepts chicken, eggs, and dairy. Patient reports a poor appetite, ongoing x2-3 years following the death of her . Cites lack of motivation to cook or eat as a contributing factor. Reports consuming a few spoonfuls per meal, likely meeting <75% estimated energy needs as a result    Patient reports usual body weight 105lb, endorses 30lb weight loss x1 year  Per Ramesh WILKS, noted the following weight history: 46.3kg (4/11), 47.6kg (2/23), 49.9kg (1/27), 48.1kg (11/17)  Objective weight measurements suggest 3.6kg (7%) weight loss x3 months (not significant)

## 2024-04-15 NOTE — PHYSICAL THERAPY INITIAL EVALUATION ADULT - STANDING BALANCE: STATIC
4/8/2024         RE: Vilma Mendoza  [address]  [facility] [address]        Dear Colleague,    Thank you for referring your patient, Vilma Mendoza, to the [facility] [place]. Please see a copy of my visit note below.        [facility], Orthopaedic Oncology Surgery Consultation  by Ubaldo Dominguez M.D.    Vilma Mendoza MRN# [id number]    YOB: 1967     Requesting physician: Maryanne Velásquez  [facility], [facility] [place]       Diagnosis:  1.  5 x 4 x 3 cm mass, consistent with vascular malformation, right shoulder neck region deep to trapezius muscle.    Procedures:  1.  3/25/2024, core needle biopsy of right shoulder/neck mass ([place]) [facility]    I reviewed the biopsy results which reveal evidence of benign met vascular malformation.  There is no evidence of malignancy present.  The likelihood of targeting error is very small.  Therefore, management would consist of either serial observation or proceeding with surgical excision.  We discussed the pros and cons and risk benefits alternatives with each treatment strategy.    My recommendation, given the patient's minimal symptoms and the anatomic location was to proceed with serial observation.  Patient does note that the mass does sometimes vary in size with activity.  Therefore I advised her to always assess the tumor size and presence at the same time repetitively, perhaps early in the morning after arising from bed.  Patient is in favor of avoiding surgical resection if possible.    Plan, return in 6 months with repeat MRI before hand.  If patient develops symptoms, progressive pain or increasing size, she will contact us for a sooner appointment.      MD Izabella Gonzalez Family Professor  Oncology and Adult Reconstructive Surgery  Dept Orthopaedic Surgery, [facility]  [phone] office, [phone] pager  [email or web address]      Total combined visit time and work time before and  after clinic visit on encounter date = 20 min     good balance

## 2024-04-15 NOTE — PHYSICAL THERAPY INITIAL EVALUATION ADULT - ADDITIONAL COMMENTS
Pt lives in a private house alone; there is 1 step to enter and ~10 steps to basement (laundry only). Pt reports her son/sister will be staying with her upon discharge to assist.

## 2024-04-15 NOTE — DIETITIAN INITIAL EVALUATION ADULT - PERTINENT MEDS FT
MEDICATIONS  (STANDING):  acetaminophen     Tablet .. 650 milliGRAM(s) Oral every 6 hours  dextrose 10% Bolus 125 milliLiter(s) IV Bolus once  dextrose 5%. 1000 milliLiter(s) (100 mL/Hr) IV Continuous <Continuous>  dextrose 5%. 1000 milliLiter(s) (50 mL/Hr) IV Continuous <Continuous>  dextrose 50% Injectable 25 Gram(s) IV Push once  dextrose 50% Injectable 25 Gram(s) IV Push once  dextrose 50% Injectable 12.5 Gram(s) IV Push once  dextrose Oral Gel 15 Gram(s) Oral once  enoxaparin Injectable 40 milliGRAM(s) SubCutaneous every 24 hours  famotidine    Tablet 20 milliGRAM(s) Oral daily  glucagon  Injectable 1 milliGRAM(s) IntraMuscular once  glucagon  Injectable 1 milliGRAM(s) IntraMuscular once  ibuprofen  Tablet. 600 milliGRAM(s) Oral every 6 hours  insulin glargine Injectable (LANTUS) 10 Unit(s) SubCutaneous at bedtime  insulin lispro (ADMELOG) corrective regimen sliding scale   SubCutaneous three times a day before meals  insulin lispro (ADMELOG) corrective regimen sliding scale   SubCutaneous at bedtime  levothyroxine 100 MICROGram(s) Oral daily  metoprolol tartrate 25 milliGRAM(s) Oral two times a day  sertraline 100 milliGRAM(s) Oral daily  sodium chloride 0.9% lock flush 3 milliLiter(s) IV Push every 8 hours    MEDICATIONS  (PRN):  benzocaine/menthol Lozenge 1 Lozenge Oral every 3 hours PRN Sore Throat  dextrose Oral Gel 15 Gram(s) Oral once PRN Blood Glucose LESS THAN 70 milliGRAM(s)/deciliter  metoprolol tartrate Injectable 5 milliGRAM(s) IV Push every 6 hours PRN hypertension  ondansetron    Tablet 8 milliGRAM(s) Oral every 8 hours PRN Nausea and/or Vomiting  oxyCODONE    IR 5 milliGRAM(s) Oral every 3 hours PRN Moderate Pain (4 - 6)  senna 2 Tablet(s) Oral at bedtime PRN Constipation  simethicone 80 milliGRAM(s) Chew every 6 hours PRN Gas

## 2024-04-16 PROBLEM — Z86.59 PERSONAL HISTORY OF OTHER MENTAL AND BEHAVIORAL DISORDERS: Chronic | Status: ACTIVE | Noted: 2024-04-05

## 2024-04-16 PROBLEM — R19.00 INTRA-ABDOMINAL AND PELVIC SWELLING, MASS AND LUMP, UNSPECIFIED SITE: Chronic | Status: ACTIVE | Noted: 2024-04-05

## 2024-04-16 PROBLEM — N39.3 STRESS INCONTINENCE (FEMALE) (MALE): Chronic | Status: ACTIVE | Noted: 2024-04-05

## 2024-04-16 PROBLEM — D21.9 BENIGN NEOPLASM OF CONNECTIVE AND OTHER SOFT TISSUE, UNSPECIFIED: Chronic | Status: ACTIVE | Noted: 2024-04-05

## 2024-04-17 ENCOUNTER — NON-APPOINTMENT (OUTPATIENT)
Age: 74
End: 2024-04-17

## 2024-04-26 ENCOUNTER — APPOINTMENT (OUTPATIENT)
Dept: GYNECOLOGIC ONCOLOGY | Facility: CLINIC | Age: 74
End: 2024-04-26
Payer: MEDICARE

## 2024-04-26 VITALS — DIASTOLIC BLOOD PRESSURE: 76 MMHG | TEMPERATURE: 98.5 F | SYSTOLIC BLOOD PRESSURE: 166 MMHG | HEART RATE: 65 BPM

## 2024-04-26 DIAGNOSIS — C54.1 MALIGNANT NEOPLASM OF ENDOMETRIUM: ICD-10-CM

## 2024-04-26 PROBLEM — D25.9 FIBROID, UTERINE: Status: ACTIVE | Noted: 2023-10-20

## 2024-04-26 PROBLEM — Z87.898 PERSONAL HISTORY OF OTHER SPECIFIED CONDITIONS: Chronic | Status: ACTIVE | Noted: 2024-04-05

## 2024-04-26 PROCEDURE — 99024 POSTOP FOLLOW-UP VISIT: CPT

## 2024-04-29 LAB — SURGICAL PATHOLOGY STUDY: SIGNIFICANT CHANGE UP

## 2024-05-01 ENCOUNTER — APPOINTMENT (OUTPATIENT)
Dept: INTERNAL MEDICINE | Facility: CLINIC | Age: 74
End: 2024-05-01
Payer: MEDICARE

## 2024-05-01 ENCOUNTER — LABORATORY RESULT (OUTPATIENT)
Age: 74
End: 2024-05-01

## 2024-05-01 VITALS
WEIGHT: 100 LBS | HEART RATE: 71 BPM | HEIGHT: 62 IN | DIASTOLIC BLOOD PRESSURE: 69 MMHG | BODY MASS INDEX: 18.4 KG/M2 | TEMPERATURE: 98.1 F | SYSTOLIC BLOOD PRESSURE: 152 MMHG | OXYGEN SATURATION: 98 %

## 2024-05-01 VITALS — DIASTOLIC BLOOD PRESSURE: 68 MMHG | SYSTOLIC BLOOD PRESSURE: 134 MMHG

## 2024-05-01 DIAGNOSIS — E55.9 VITAMIN D DEFICIENCY, UNSPECIFIED: ICD-10-CM

## 2024-05-01 DIAGNOSIS — Z92.89 PERSONAL HISTORY OF OTHER MEDICAL TREATMENT: ICD-10-CM

## 2024-05-01 DIAGNOSIS — Z01.818 ENCOUNTER FOR OTHER PREPROCEDURAL EXAMINATION: ICD-10-CM

## 2024-05-01 DIAGNOSIS — S82.851D DISPLACED TRIMALLEOLAR FRACTURE OF RIGHT LOWER LEG, SUBSEQUENT ENCOUNTER FOR CLOSED FRACTURE WITH ROUTINE HEALING: ICD-10-CM

## 2024-05-01 PROCEDURE — 99495 TRANSJ CARE MGMT MOD F2F 14D: CPT

## 2024-05-01 RX ORDER — INSULIN LISPRO 100 U/ML
100 INJECTION, SOLUTION INTRAVENOUS; SUBCUTANEOUS
Qty: 1 | Refills: 0 | Status: ACTIVE | COMMUNITY
Start: 2024-03-27

## 2024-05-01 RX ORDER — INSULIN GLARGINE 100 [IU]/ML
100 INJECTION, SOLUTION SUBCUTANEOUS
Qty: 1 | Refills: 0 | Status: ACTIVE | COMMUNITY
Start: 2023-11-30

## 2024-05-01 RX ORDER — SYRINGE AND NEEDLE,INSULIN,1ML 28GX1/2"
31G X 5/16" SYRINGE, EMPTY DISPOSABLE MISCELLANEOUS
Qty: 100 | Refills: 0 | Status: ACTIVE | COMMUNITY
Start: 2024-02-29

## 2024-05-01 NOTE — PHYSICAL EXAM
[No Acute Distress] : no acute distress [Normal Sclera/Conjunctiva] : normal sclera/conjunctiva [Normal Oropharynx] : the oropharynx was normal [Clear to Auscultation] : lungs were clear to auscultation bilaterally [Regular Rhythm] : with a regular rhythm [Normal S1, S2] : normal S1 and S2 [No Edema] : there was no peripheral edema [de-identified] : normal gait

## 2024-05-01 NOTE — HISTORY OF PRESENT ILLNESS
[FreeTextEntry2] : see nurse note & contact; pt in hospital for 4 days for nico; contacted by nurse 4/16; had fu Dr Cassidy 4/26. Pt appears well but tired; states fatigue and concern about diagnosis are her only current concerns; eating well; no Gi or  sx. Medication list is reviewed/reconciled. She was also taking (for past year) Vit D 58247 prescribed by her cardiologist in addition to checking CBC today we will obtain a Vitamin D level, I advised her it was likely time to stop this medication.  Hospital labs were reviewed with the patient at her request.  Her endocrinologist has decreased her insulin dose as she has lost significant weight

## 2024-05-02 LAB
25(OH)D3 SERPL-MCNC: 75.5 NG/ML
BASOPHILS # BLD AUTO: 0.15 K/UL
BASOPHILS NFR BLD AUTO: 1.9 %
EOSINOPHIL # BLD AUTO: 0.95 K/UL
EOSINOPHIL NFR BLD AUTO: 12.2 %
HCT VFR BLD CALC: 37.6 %
HGB BLD-MCNC: 10.7 G/DL
IMM GRANULOCYTES NFR BLD AUTO: 0.3 %
LYMPHOCYTES # BLD AUTO: 2.82 K/UL
LYMPHOCYTES NFR BLD AUTO: 36.2 %
MAN DIFF?: NORMAL
MCHC RBC-ENTMCNC: 25.4 PG
MCHC RBC-ENTMCNC: 28.5 GM/DL
MCV RBC AUTO: 89.1 FL
MONOCYTES # BLD AUTO: 0.41 K/UL
MONOCYTES NFR BLD AUTO: 5.3 %
NEUTROPHILS # BLD AUTO: 3.45 K/UL
NEUTROPHILS NFR BLD AUTO: 44.1 %
PLATELET # BLD AUTO: 382 K/UL
RBC # BLD: 4.22 M/UL
RBC # FLD: 21 %
WBC # FLD AUTO: 7.8 K/UL

## 2024-05-02 RX ORDER — ERGOCALCIFEROL 1.25 MG/1
1.25 MG CAPSULE, LIQUID FILLED ORAL
Refills: 0 | Status: DISCONTINUED | COMMUNITY
Start: 2024-05-01 | End: 2024-05-02

## 2024-05-03 ENCOUNTER — APPOINTMENT (OUTPATIENT)
Dept: GYNECOLOGIC ONCOLOGY | Facility: CLINIC | Age: 74
End: 2024-05-03
Payer: MEDICARE

## 2024-05-03 VITALS — TEMPERATURE: 97.3 F | DIASTOLIC BLOOD PRESSURE: 61 MMHG | HEART RATE: 66 BPM | SYSTOLIC BLOOD PRESSURE: 154 MMHG

## 2024-05-03 DIAGNOSIS — D25.9 LEIOMYOMA OF UTERUS, UNSPECIFIED: ICD-10-CM

## 2024-05-03 PROBLEM — Z87.81 PERSONAL HISTORY OF (HEALED) TRAUMATIC FRACTURE: Chronic | Status: ACTIVE | Noted: 2024-04-05

## 2024-05-03 PROCEDURE — 99024 POSTOP FOLLOW-UP VISIT: CPT

## 2024-05-07 ENCOUNTER — NON-APPOINTMENT (OUTPATIENT)
Age: 74
End: 2024-05-07

## 2024-05-16 PROBLEM — C54.1 ENDOMETRIAL ADENOCARCINOMA: Status: ACTIVE | Noted: 2024-05-03

## 2024-05-16 NOTE — DISCUSSION/SUMMARY
[Clean] : was clean [Dry] : was dry [Intact] : was intact [None] : had no drainage [Normal Skin] : normal appearance [Doing Well] : is doing well [Erythema] : was not erythematous [Ecchymosis] : was not ecchymotic [FreeTextEntry9] : s/nt/nd; no CVAT; ext neg [de-identified] : cuff inact; no blood or discharge seen

## 2024-05-16 NOTE — REASON FOR VISIT
[Post Op] : post op visit [de-identified] : 4/11/24 [de-identified] : ELAP, MARTÍN, BSO, PPALND, omentectomy [de-identified] : She reports no vaginal bleeding, vaginal discharge or fever. She has no  or GI concerns. She reports increased activity and continues to feel improved, she notes.

## 2024-05-16 NOTE — REASON FOR VISIT
[de-identified] : 4/11/24 [de-identified] : ELAP, MARTÍN, BSO, PPALND, omentectomy [de-identified] : She reports no vaginal bleeding, vaginal discharge or fever. She has no GI or  concerns.

## 2024-05-16 NOTE — ASSESSMENT
[FreeTextEntry1] : She is recuperating well and we await the final path. Her instructions were discussed. All Q/A. She'll RTOin ~2wk for an additional POV.

## 2024-05-16 NOTE — LETTER BODY
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  The tumor was superficially invasive into the myometrium.  The cytology was negative, there was no LVSI, and the other sampling was negative.  She is recuperating well from surgery.  I have advised consultations with radiation oncology and medical oncology.  Plans for ongoing oncologic surveillance were reviewed.  MMR IHC testing revealed intact nuclear expression for all proteins.  She will return to see me in 3 months time, sooner as needed. Bharath Álvarez(Attending)

## 2024-05-16 NOTE — DISCUSSION/SUMMARY
[Erythema] : was not erythematous [Ecchymosis] : was not ecchymotic [None] : had no drainage [Normal Skin] : normal appearance [Doing Well] : is doing well [FreeTextEntry9] : s/nt/nd; no CVAT [de-identified] : exam deferred (PP)

## 2024-05-23 ENCOUNTER — RX RENEWAL (OUTPATIENT)
Age: 74
End: 2024-05-23

## 2024-05-23 RX ORDER — SERTRALINE HYDROCHLORIDE 100 MG/1
100 TABLET, FILM COATED ORAL
Qty: 90 | Refills: 1 | Status: ACTIVE | COMMUNITY
Start: 2023-07-24 | End: 1900-01-01

## 2024-08-08 ENCOUNTER — NON-APPOINTMENT (OUTPATIENT)
Age: 74
End: 2024-08-08

## 2024-08-09 ENCOUNTER — APPOINTMENT (OUTPATIENT)
Dept: GYNECOLOGIC ONCOLOGY | Facility: CLINIC | Age: 74
End: 2024-08-09

## 2024-08-09 PROCEDURE — 99214 OFFICE O/P EST MOD 30 MIN: CPT

## 2024-08-09 PROCEDURE — 99459 PELVIC EXAMINATION: CPT

## 2024-08-11 NOTE — PHYSICAL EXAM
[Chaperone Present] : A chaperone was present in the examining room during all aspects of the physical examination [[id number]] : A chaperone was present during the pelvic exam. [FreeTextEntry2] : Dayna [Absent] : Adnexa(ae): Absent [Normal] : Recto-Vaginal Exam: Normal [de-identified] : Trace edema [de-identified] : Inc CDI [de-identified] : atrophy

## 2024-08-11 NOTE — HISTORY OF PRESENT ILLNESS
[FreeTextEntry1] : Stage IA Clear cell and serous Ut Ca MARTÍN, BSO, staging - 4/11/24 MMR Intact Referring GYN - Dr. Julio White PCP - Dr. Adama Estevez Uro - Dr. Aaron Lezama Pulm - Dr. Kristen Washington GI - none of late Heme/Onc: Dr Андрей Gregory Surg: Dr REAL Loya  She RTO feeling improved, noting no VB, VD, or pain. She notes no GI or  concerns.  We disc my prev rec for Med Onc and Rad Onc consultations. She confirms that she did not undertake these, and says she did not feel she could pursue them previously. She is to see her Med Onc shortly and will consider the Rad Onc eval after that.

## 2024-08-11 NOTE — PHYSICAL EXAM
[Chaperone Present] : A chaperone was present in the examining room during all aspects of the physical examination [23942] : A chaperone was present during the pelvic exam. [FreeTextEntry2] : Dayna [Absent] : Adnexa(ae): Absent [Normal] : Recto-Vaginal Exam: Normal [de-identified] : Trace edema [de-identified] : atrophy [de-identified] : Inc CDI

## 2024-08-11 NOTE — DISCUSSION/SUMMARY
[Reviewed Clinical Lab Test(s)] : Results of clinical tests were reviewed. [Reviewed Radiology Report(s)] : Radiology reports were reviewed. [FreeTextEntry1] : We disc her status, which is clinically SHAHIDA. -We again reviewed my advice for Med Onc and Rad consultations, though the role after the interval from surg is unclear. As noted in the HPI, she will see her Med Onc provider (path report has been provided to her) and then also decide if she is willing to see a Rad Onc doc.  -We reviewed her precautions.  -Her instructions were reviewed. -All Q/A.  -She'll RTO in , sooner prn.

## 2024-09-18 ENCOUNTER — RX RENEWAL (OUTPATIENT)
Age: 74
End: 2024-09-18

## 2024-11-04 ENCOUNTER — APPOINTMENT (OUTPATIENT)
Dept: GYNECOLOGIC ONCOLOGY | Facility: CLINIC | Age: 74
End: 2024-11-04

## 2024-12-05 ENCOUNTER — APPOINTMENT (OUTPATIENT)
Dept: INTERNAL MEDICINE | Facility: CLINIC | Age: 74
End: 2024-12-05

## 2024-12-13 ENCOUNTER — NON-APPOINTMENT (OUTPATIENT)
Age: 74
End: 2024-12-13

## 2024-12-18 ENCOUNTER — APPOINTMENT (OUTPATIENT)
Dept: INTERNAL MEDICINE | Facility: CLINIC | Age: 74
End: 2024-12-18

## 2024-12-18 VITALS
SYSTOLIC BLOOD PRESSURE: 110 MMHG | DIASTOLIC BLOOD PRESSURE: 65 MMHG | BODY MASS INDEX: 22.45 KG/M2 | TEMPERATURE: 97.3 F | OXYGEN SATURATION: 96 % | HEART RATE: 69 BPM | WEIGHT: 122 LBS | HEIGHT: 62 IN

## 2024-12-18 DIAGNOSIS — Z23 ENCOUNTER FOR IMMUNIZATION: ICD-10-CM

## 2024-12-18 DIAGNOSIS — E11.9 TYPE 2 DIABETES MELLITUS W/OUT COMPLICATIONS: ICD-10-CM

## 2024-12-18 DIAGNOSIS — Z00.00 ENCOUNTER FOR GENERAL ADULT MEDICAL EXAMINATION W/OUT ABNORMAL FINDINGS: ICD-10-CM

## 2024-12-18 PROCEDURE — 99213 OFFICE O/P EST LOW 20 MIN: CPT | Mod: 25

## 2024-12-18 PROCEDURE — G0008: CPT

## 2024-12-18 PROCEDURE — 90662 IIV NO PRSV INCREASED AG IM: CPT

## 2024-12-18 RX ORDER — SITAGLIPTIN AND METFORMIN HYDROCHLORIDE 50; 500 MG/1; MG/1
50-500 TABLET, FILM COATED, EXTENDED RELEASE ORAL DAILY
Qty: 30 | Refills: 0 | Status: ACTIVE | COMMUNITY
Start: 2024-12-18 | End: 1900-01-01

## 2025-04-15 ENCOUNTER — RX RENEWAL (OUTPATIENT)
Age: 75
End: 2025-04-15

## 2025-06-19 ENCOUNTER — APPOINTMENT (OUTPATIENT)
Dept: INTERNAL MEDICINE | Facility: CLINIC | Age: 75
End: 2025-06-19

## 2025-07-17 ENCOUNTER — RX RENEWAL (OUTPATIENT)
Age: 75
End: 2025-07-17

## (undated) DEVICE — DRAPE 3/4 SHEET 52X76"

## (undated) DEVICE — ELCTR ROCKER SWITCH PENCIL BLUE 10FT

## (undated) DEVICE — PREP TRAY DRY SKIN PREP SCRUB

## (undated) DEVICE — SUT VICRYL 2-0 27" CT-2 UNDYED

## (undated) DEVICE — LIGASURE IMPACT

## (undated) DEVICE — SOL IRR POUR NS 0.9% 500ML

## (undated) DEVICE — ABDOMINAL BINDER MED/LG 12" X 36"-54"

## (undated) DEVICE — POSITIONER PINK PAD PIGAZZI SYSTEM

## (undated) DEVICE — SUT VICRYL 1 36" CT-1 UNDYED

## (undated) DEVICE — PACK MAJOR ABDOMINAL WITH LAP

## (undated) DEVICE — SUT MAXON 1 60" T-60

## (undated) DEVICE — DRSG DERMABOND PRINEO 42CM

## (undated) DEVICE — SYR ASEPTO

## (undated) DEVICE — SUT VICRYL 1 18" TIES UNDYED

## (undated) DEVICE — POSITIONER FOAM EGG CRATE ULNAR 2PCS (PINK)

## (undated) DEVICE — GOWN LG

## (undated) DEVICE — GLV 7 PROTEXIS (WHITE)

## (undated) DEVICE — SUT VICRYL 3-0 27" SH UNDYED

## (undated) DEVICE — GLV 7.5 PROTEXIS (BLUE)

## (undated) DEVICE — DRAPE FLUID WARMER 44 X 44"

## (undated) DEVICE — FOLEY TRAY 16FR 5CC LF UMETER CLOSED

## (undated) DEVICE — SOL IRR BAG H2O 1000ML

## (undated) DEVICE — DRAPE MAGNETIC INSTRUMENT MEDIUM

## (undated) DEVICE — DRAPE INSTRUMENT POUCH 6.75" X 11"

## (undated) DEVICE — TUBING TUR 2 PRONG

## (undated) DEVICE — MEDICINE CUP WITH LID 60ML

## (undated) DEVICE — DRSG TELFA 3 X 8

## (undated) DEVICE — VENODYNE/SCD SLEEVE CALF MEDIUM

## (undated) DEVICE — ABDOMINAL BINDER MED/LG 9" X 36"-64"

## (undated) DEVICE — SUT VICRYL 2-0 27" SH UNDYED

## (undated) DEVICE — Device

## (undated) DEVICE — BASIN SET SINGLE

## (undated) DEVICE — SUT QUILL MONODERM 3-0 30CM PS-2

## (undated) DEVICE — ELCTR REM POLYHESIVE ADULT PT RETURN 15FT

## (undated) DEVICE — LAP PAD W RING 18 X 18"

## (undated) DEVICE — SUCTION YANKAUER NO CONTROL VENT

## (undated) DEVICE — WARMING BLANKET UPPER ADULT